# Patient Record
Sex: FEMALE | Race: WHITE | NOT HISPANIC OR LATINO | Employment: STUDENT | URBAN - METROPOLITAN AREA
[De-identification: names, ages, dates, MRNs, and addresses within clinical notes are randomized per-mention and may not be internally consistent; named-entity substitution may affect disease eponyms.]

---

## 2023-06-21 ENCOUNTER — OFFICE VISIT (OUTPATIENT)
Dept: PRIMARY CARE | Facility: CLINIC | Age: 23
End: 2023-06-21
Payer: OTHER GOVERNMENT

## 2023-06-21 VITALS
TEMPERATURE: 98.3 F | SYSTOLIC BLOOD PRESSURE: 104 MMHG | WEIGHT: 116 LBS | HEIGHT: 65 IN | BODY MASS INDEX: 19.33 KG/M2 | OXYGEN SATURATION: 98 % | DIASTOLIC BLOOD PRESSURE: 72 MMHG

## 2023-06-21 DIAGNOSIS — Z48.02 ENCOUNTER FOR REMOVAL OF SUTURES: Primary | ICD-10-CM

## 2023-06-21 PROCEDURE — 1036F TOBACCO NON-USER: CPT | Performed by: NURSE PRACTITIONER

## 2023-06-21 PROCEDURE — 99202 OFFICE O/P NEW SF 15 MIN: CPT | Performed by: NURSE PRACTITIONER

## 2023-06-21 ASSESSMENT — ENCOUNTER SYMPTOMS
CONSTIPATION: 0
FEVER: 0
CHILLS: 0
DIARRHEA: 0
FATIGUE: 0
WOUND: 1
NAUSEA: 0
HEADACHES: 1
APPETITE CHANGE: 0
SLEEP DISTURBANCE: 0
ACTIVITY CHANGE: 0
VOMITING: 0
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
## 2023-06-21 NOTE — ASSESSMENT & PLAN NOTE
5 sutures removed for L eyelid  Pt tolerated well  Educated on s/s of infection  Follow up with PCP as planned  ER for any s/s of infection

## 2023-06-27 ENCOUNTER — OFFICE VISIT (OUTPATIENT)
Dept: PRIMARY CARE | Facility: CLINIC | Age: 23
End: 2023-06-27
Payer: OTHER GOVERNMENT

## 2023-06-27 VITALS
DIASTOLIC BLOOD PRESSURE: 68 MMHG | SYSTOLIC BLOOD PRESSURE: 100 MMHG | BODY MASS INDEX: 18.99 KG/M2 | TEMPERATURE: 97.5 F | RESPIRATION RATE: 18 BRPM | HEIGHT: 65 IN | HEART RATE: 82 BPM | WEIGHT: 114 LBS | OXYGEN SATURATION: 97 %

## 2023-06-27 DIAGNOSIS — H11.32 SUBCONJUNCTIVAL HEMORRHAGE OF LEFT EYE: ICD-10-CM

## 2023-06-27 DIAGNOSIS — Z83.42 FAMILY HISTORY OF FAMILIAL HYPERCHOLESTEROLEMIA: ICD-10-CM

## 2023-06-27 DIAGNOSIS — F32.A DEPRESSION, UNSPECIFIED DEPRESSION TYPE: ICD-10-CM

## 2023-06-27 DIAGNOSIS — Z12.4 SCREENING FOR CERVICAL CANCER: ICD-10-CM

## 2023-06-27 DIAGNOSIS — Z00.00 WELLNESS EXAMINATION: Primary | ICD-10-CM

## 2023-06-27 DIAGNOSIS — R20.2 NUMBNESS AND TINGLING IN LEFT ARM: ICD-10-CM

## 2023-06-27 DIAGNOSIS — F41.8 ANXIETY ABOUT HEALTH: ICD-10-CM

## 2023-06-27 DIAGNOSIS — R20.0 NUMBNESS AND TINGLING IN LEFT ARM: ICD-10-CM

## 2023-06-27 DIAGNOSIS — S01.81XD LACERATION OF SKIN OF FACE, SUBSEQUENT ENCOUNTER: ICD-10-CM

## 2023-06-27 PROCEDURE — 99214 OFFICE O/P EST MOD 30 MIN: CPT | Performed by: NURSE PRACTITIONER

## 2023-06-27 ASSESSMENT — PATIENT HEALTH QUESTIONNAIRE - PHQ9
2. FEELING DOWN, DEPRESSED OR HOPELESS: NEARLY EVERY DAY
1. LITTLE INTEREST OR PLEASURE IN DOING THINGS: NEARLY EVERY DAY
SUM OF ALL RESPONSES TO PHQ9 QUESTIONS 1 AND 2: 6

## 2023-06-27 ASSESSMENT — ENCOUNTER SYMPTOMS
SHORTNESS OF BREATH: 0
DIZZINESS: 0
NERVOUS/ANXIOUS: 1
HEADACHES: 0
DEPRESSION: 0
OCCASIONAL FEELINGS OF UNSTEADINESS: 0
PALPITATIONS: 0
NECK PAIN: 0
NUMBNESS: 1
COUGH: 0
FATIGUE: 0
LOSS OF SENSATION IN FEET: 0
FEVER: 0
DYSPHORIC MOOD: 1
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
## 2023-10-09 ENCOUNTER — HOSPITAL ENCOUNTER (EMERGENCY)
Facility: HOSPITAL | Age: 23
Discharge: HOME | End: 2023-10-09
Attending: EMERGENCY MEDICINE
Payer: OTHER GOVERNMENT

## 2023-10-09 VITALS
DIASTOLIC BLOOD PRESSURE: 70 MMHG | HEART RATE: 64 BPM | TEMPERATURE: 97.9 F | HEIGHT: 65 IN | RESPIRATION RATE: 16 BRPM | BODY MASS INDEX: 17.83 KG/M2 | SYSTOLIC BLOOD PRESSURE: 132 MMHG | WEIGHT: 107 LBS | OXYGEN SATURATION: 95 %

## 2023-10-09 DIAGNOSIS — J18.9 ATYPICAL PNEUMONIA: ICD-10-CM

## 2023-10-09 DIAGNOSIS — M94.0 COSTOCHONDRITIS, ACUTE: Primary | ICD-10-CM

## 2023-10-09 LAB
ALBUMIN SERPL BCP-MCNC: 4.1 G/DL (ref 3.4–5)
ALP SERPL-CCNC: 85 U/L (ref 33–110)
ALT SERPL W P-5'-P-CCNC: 16 U/L (ref 7–45)
ANION GAP SERPL CALC-SCNC: 12 MMOL/L (ref 10–20)
AST SERPL W P-5'-P-CCNC: 22 U/L (ref 9–39)
B-HCG SERPL-ACNC: <2 MIU/ML
BASOPHILS # BLD AUTO: 0.05 X10*3/UL (ref 0–0.1)
BASOPHILS NFR BLD AUTO: 0.3 %
BILIRUB SERPL-MCNC: 0.8 MG/DL (ref 0–1.2)
BUN SERPL-MCNC: 10 MG/DL (ref 6–23)
CALCIUM SERPL-MCNC: 8.8 MG/DL (ref 8.6–10.3)
CARDIAC TROPONIN I PNL SERPL HS: 3 NG/L (ref 0–13)
CHLORIDE SERPL-SCNC: 107 MMOL/L (ref 98–107)
CO2 SERPL-SCNC: 22 MMOL/L (ref 21–32)
CREAT SERPL-MCNC: 0.79 MG/DL (ref 0.5–1.05)
EOSINOPHIL # BLD AUTO: 0.13 X10*3/UL (ref 0–0.7)
EOSINOPHIL NFR BLD AUTO: 0.8 %
ERYTHROCYTE [DISTWIDTH] IN BLOOD BY AUTOMATED COUNT: 13.2 % (ref 11.5–14.5)
GFR SERPL CREATININE-BSD FRML MDRD: >90 ML/MIN/1.73M*2
GLUCOSE SERPL-MCNC: 104 MG/DL (ref 74–99)
HCT VFR BLD AUTO: 40.5 % (ref 36–46)
HGB BLD-MCNC: 13.2 G/DL (ref 12–16)
IMM GRANULOCYTES # BLD AUTO: 0.05 X10*3/UL (ref 0–0.7)
IMM GRANULOCYTES NFR BLD AUTO: 0.3 % (ref 0–0.9)
LYMPHOCYTES # BLD AUTO: 2.03 X10*3/UL (ref 1.2–4.8)
LYMPHOCYTES NFR BLD AUTO: 12.5 %
MCH RBC QN AUTO: 30.3 PG (ref 26–34)
MCHC RBC AUTO-ENTMCNC: 32.6 G/DL (ref 32–36)
MCV RBC AUTO: 93 FL (ref 80–100)
MONOCYTES # BLD AUTO: 1.08 X10*3/UL (ref 0.1–1)
MONOCYTES NFR BLD AUTO: 6.7 %
NEUTROPHILS # BLD AUTO: 12.87 X10*3/UL (ref 1.2–7.7)
NEUTROPHILS NFR BLD AUTO: 79.4 %
NRBC BLD-RTO: 0 /100 WBCS (ref 0–0)
PLATELET # BLD AUTO: 431 X10*3/UL (ref 150–450)
PMV BLD AUTO: 9 FL (ref 7.5–11.5)
POTASSIUM SERPL-SCNC: 3.9 MMOL/L (ref 3.5–5.3)
PROT SERPL-MCNC: 6.9 G/DL (ref 6.4–8.2)
RBC # BLD AUTO: 4.35 X10*6/UL (ref 4–5.2)
SODIUM SERPL-SCNC: 137 MMOL/L (ref 136–145)
WBC # BLD AUTO: 16.2 X10*3/UL (ref 4.4–11.3)

## 2023-10-09 PROCEDURE — 80053 COMPREHEN METABOLIC PANEL: CPT | Performed by: STUDENT IN AN ORGANIZED HEALTH CARE EDUCATION/TRAINING PROGRAM

## 2023-10-09 PROCEDURE — 99284 EMERGENCY DEPT VISIT MOD MDM: CPT | Performed by: EMERGENCY MEDICINE

## 2023-10-09 PROCEDURE — 85025 COMPLETE CBC W/AUTO DIFF WBC: CPT | Performed by: EMERGENCY MEDICINE

## 2023-10-09 PROCEDURE — 80053 COMPREHEN METABOLIC PANEL: CPT | Performed by: EMERGENCY MEDICINE

## 2023-10-09 PROCEDURE — 84484 ASSAY OF TROPONIN QUANT: CPT | Performed by: EMERGENCY MEDICINE

## 2023-10-09 PROCEDURE — 71045 X-RAY EXAM CHEST 1 VIEW: CPT | Performed by: RADIOLOGY

## 2023-10-09 PROCEDURE — 85025 COMPLETE CBC W/AUTO DIFF WBC: CPT | Performed by: STUDENT IN AN ORGANIZED HEALTH CARE EDUCATION/TRAINING PROGRAM

## 2023-10-09 PROCEDURE — 2500000004 HC RX 250 GENERAL PHARMACY W/ HCPCS (ALT 636 FOR OP/ED)

## 2023-10-09 PROCEDURE — 84702 CHORIONIC GONADOTROPIN TEST: CPT | Performed by: STUDENT IN AN ORGANIZED HEALTH CARE EDUCATION/TRAINING PROGRAM

## 2023-10-09 PROCEDURE — 99283 EMERGENCY DEPT VISIT LOW MDM: CPT | Mod: 25

## 2023-10-09 PROCEDURE — 36415 COLL VENOUS BLD VENIPUNCTURE: CPT | Performed by: STUDENT IN AN ORGANIZED HEALTH CARE EDUCATION/TRAINING PROGRAM

## 2023-10-09 PROCEDURE — 84484 ASSAY OF TROPONIN QUANT: CPT | Performed by: STUDENT IN AN ORGANIZED HEALTH CARE EDUCATION/TRAINING PROGRAM

## 2023-10-09 PROCEDURE — 84702 CHORIONIC GONADOTROPIN TEST: CPT | Performed by: EMERGENCY MEDICINE

## 2023-10-09 PROCEDURE — 96372 THER/PROPH/DIAG INJ SC/IM: CPT

## 2023-10-09 RX ORDER — HYDROCODONE BITARTRATE AND ACETAMINOPHEN 5; 325 MG/1; MG/1
1 TABLET ORAL EVERY 8 HOURS PRN
Qty: 9 TABLET | Refills: 0 | Status: SHIPPED | OUTPATIENT
Start: 2023-10-09 | End: 2023-10-12

## 2023-10-09 RX ORDER — IBUPROFEN 600 MG/1
600 TABLET ORAL EVERY 6 HOURS PRN
Qty: 28 TABLET | Refills: 0 | Status: SHIPPED | OUTPATIENT
Start: 2023-10-09 | End: 2023-10-16

## 2023-10-09 RX ORDER — KETOROLAC TROMETHAMINE 30 MG/ML
15 INJECTION, SOLUTION INTRAMUSCULAR; INTRAVENOUS ONCE
Status: COMPLETED | OUTPATIENT
Start: 2023-10-09 | End: 2023-10-09

## 2023-10-09 RX ORDER — DOXYCYCLINE 100 MG/1
100 CAPSULE ORAL 2 TIMES DAILY
Qty: 20 CAPSULE | Refills: 0 | Status: SHIPPED | OUTPATIENT
Start: 2023-10-09 | End: 2023-10-19

## 2023-10-09 RX ORDER — MORPHINE SULFATE 2 MG/ML
2 INJECTION, SOLUTION INTRAMUSCULAR; INTRAVENOUS ONCE
Status: DISCONTINUED | OUTPATIENT
Start: 2023-10-09 | End: 2023-10-09

## 2023-10-09 RX ADMIN — KETOROLAC TROMETHAMINE 15 MG: 60 INJECTION, SOLUTION INTRAMUSCULAR at 17:51

## 2023-10-09 ASSESSMENT — COLUMBIA-SUICIDE SEVERITY RATING SCALE - C-SSRS
2. HAVE YOU ACTUALLY HAD ANY THOUGHTS OF KILLING YOURSELF?: NO
1. IN THE PAST MONTH, HAVE YOU WISHED YOU WERE DEAD OR WISHED YOU COULD GO TO SLEEP AND NOT WAKE UP?: NO
6. HAVE YOU EVER DONE ANYTHING, STARTED TO DO ANYTHING, OR PREPARED TO DO ANYTHING TO END YOUR LIFE?: NO

## 2023-10-09 ASSESSMENT — LIFESTYLE VARIABLES
EVER FELT BAD OR GUILTY ABOUT YOUR DRINKING: NO
EVER HAD A DRINK FIRST THING IN THE MORNING TO STEADY YOUR NERVES TO GET RID OF A HANGOVER: NO
HAVE PEOPLE ANNOYED YOU BY CRITICIZING YOUR DRINKING: NO
HAVE YOU EVER FELT YOU SHOULD CUT DOWN ON YOUR DRINKING: NO

## 2023-10-09 ASSESSMENT — PAIN SCALES - GENERAL: PAINLEVEL_OUTOF10: 10 - WORST POSSIBLE PAIN

## 2023-10-09 ASSESSMENT — PAIN - FUNCTIONAL ASSESSMENT: PAIN_FUNCTIONAL_ASSESSMENT: 0-10

## 2023-10-09 ASSESSMENT — PAIN DESCRIPTION - DESCRIPTORS: DESCRIPTORS: ACHING

## 2023-10-09 ASSESSMENT — PAIN DESCRIPTION - ONSET: ONSET: SUDDEN

## 2023-10-09 ASSESSMENT — PAIN DESCRIPTION - ORIENTATION: ORIENTATION: RIGHT

## 2023-10-09 ASSESSMENT — PAIN DESCRIPTION - LOCATION: LOCATION: RIB CAGE

## 2023-10-09 ASSESSMENT — PAIN DESCRIPTION - FREQUENCY: FREQUENCY: CONSTANT/CONTINUOUS

## 2023-10-09 NOTE — DISCHARGE INSTRUCTIONS
Please call your PCP office in 48 hours to establish follow-up.    You are prescribed medication, please use as directed.    Return to the emergency department if you develop fever, increasing shortness of breath, increases in heart rate, uncontrollable pain, or any other new symptoms you find concerning.

## 2023-10-10 NOTE — ED PROVIDER NOTES
"HPI   Chief Complaint   Patient presents with    Flank Pain     Right rib pain below breast started yesterday, denies injury       Patient is a 23-year-old female with no known medical history presenting with right-sided rib pain.  Pain began last night, located on right rib, constant, sharp, aggravated by breathing deeply and movement, no alleviating factors, no radiation, 10/10 severity.  Associated with fever, chills, nausea, palpitations.  She denies recent travel, recent surgery, periods of prolonged immobilization.    Denies  vomiting, diarrhea, shortness of breath, cough, abdominal pain, urinary changes, hematuria, dysuria, changes in stool, hematochezia, melena.  PMH: As above  PSH: Reviewed  Allergies: NKDA  SH:   Tobacco: Reports vaping, when questioned further on how much response with \" I do not know\"  Alcohol: patient unable to give objective measure, reports \"moderate\".  Drug use: Reports cocaine use 1 month ago.  FMH: Reviewed and noncontributory                          Akbar Coma Scale Score: 15                  Patient History   No past medical history on file.  Past Surgical History:   Procedure Laterality Date    APPENDECTOMY      HERNIA REPAIR      WISDOM TOOTH EXTRACTION       Family History   Problem Relation Name Age of Onset    Hypertension Mother      Colon polyps Mother      Sleep apnea Father      Other (dm 2) Maternal Grandmother      Colon cancer Maternal Grandfather      Sleep apnea Paternal Grandmother       Social History     Tobacco Use    Smoking status: Every Day     Packs/day: 1     Types: Cigarettes     Passive exposure: Current    Smokeless tobacco: Never   Vaping Use    Vaping Use: Every day   Substance Use Topics    Alcohol use: Yes    Drug use: Yes     Types: MDMA (ecstacy), Cocaine       Physical Exam   ED Triage Vitals [10/09/23 1348]   Temp Heart Rate Resp BP   36.6 °C (97.9 °F) 78 18 140/67      SpO2 Temp Source Heart Rate Source Patient Position   100 % Temporal " Monitor --      BP Location FiO2 (%)     Left arm --       Physical Exam  Vitals reviewed.   Constitutional:       General: She is in acute distress.      Appearance: Normal appearance. She is not ill-appearing, toxic-appearing or diaphoretic.   HENT:      Head: Normocephalic and atraumatic.      Right Ear: External ear normal.      Left Ear: External ear normal.      Nose: Nose normal.      Mouth/Throat:      Mouth: Mucous membranes are moist.      Pharynx: Oropharynx is clear.   Eyes:      Extraocular Movements: Extraocular movements intact.      Pupils: Pupils are equal, round, and reactive to light.   Cardiovascular:      Rate and Rhythm: Normal rate and regular rhythm.      Pulses: Normal pulses.      Heart sounds: Normal heart sounds. No murmur heard.     No friction rub. No gallop.   Pulmonary:      Effort: No respiratory distress.      Breath sounds: Normal breath sounds. No wheezing, rhonchi or rales.   Abdominal:      General: Abdomen is flat. Bowel sounds are normal. There is no distension.      Palpations: Abdomen is soft. There is no mass.      Tenderness: There is no abdominal tenderness.      Hernia: No hernia is present.   Musculoskeletal:         General: Tenderness present. Normal range of motion.      Cervical back: Normal range of motion and neck supple.      Comments: Right rib tenderness to palpation on the lateral chest wall.   Skin:     General: Skin is warm and dry.      Capillary Refill: Capillary refill takes less than 2 seconds.   Neurological:      General: No focal deficit present.      Mental Status: She is alert and oriented to person, place, and time. Mental status is at baseline.   Psychiatric:         Mood and Affect: Mood normal.         Behavior: Behavior normal.         ED Course & MDM        Medical Decision Making  23-year-old female presenting with right rib pain.  Work-up significant for Negative hCG, negative troponin, CMP unremarkable, CBC with 16.2 WBC and 12.87  neutrophils, chest x-ray shows no active disease in the chest.  Patient was given Toradol for pain control with improvement in pain.  Patient is PERC negative.  Patient presentation likely due to chest wall pain syndrome.  Patient history concerning for atypical pneumonia as she reports she had a cold 4 days prior to presentation.  Patient prescribed doxycycline, Norco for breakthrough pain, and ibuprofen.  Instructed to follow-up with PCP.  Return precautions given.  Patient verbalized understanding.  Patient medically stable for discharge.        Procedure  Procedures     Arash Briggs DO  Resident  10/10/23 0038

## 2023-10-11 ENCOUNTER — HOSPITAL ENCOUNTER (OUTPATIENT)
Dept: CARDIOLOGY | Facility: HOSPITAL | Age: 23
Discharge: HOME | End: 2023-10-11
Payer: OTHER GOVERNMENT

## 2023-10-11 LAB
ATRIAL RATE: 89 BPM
P AXIS: 22 DEGREES
P OFFSET: 190 MS
P ONSET: 152 MS
PR INTERVAL: 136 MS
Q ONSET: 220 MS
QRS COUNT: 15 BEATS
QRS DURATION: 86 MS
QT INTERVAL: 382 MS
QTC CALCULATION(BAZETT): 464 MS
QTC FREDERICIA: 435 MS
R AXIS: 59 DEGREES
T AXIS: 46 DEGREES
T OFFSET: 411 MS
VENTRICULAR RATE: 89 BPM

## 2023-10-11 PROCEDURE — 93005 ELECTROCARDIOGRAM TRACING: CPT

## 2023-10-13 ENCOUNTER — HOSPITAL ENCOUNTER (OUTPATIENT)
Dept: CARDIOLOGY | Facility: HOSPITAL | Age: 23
Discharge: HOME | End: 2023-10-13
Payer: OTHER GOVERNMENT

## 2023-10-13 PROCEDURE — 93005 ELECTROCARDIOGRAM TRACING: CPT

## 2024-04-18 ENCOUNTER — OFFICE VISIT (OUTPATIENT)
Dept: BEHAVIORAL HEALTH | Facility: CLINIC | Age: 24
End: 2024-04-18
Payer: OTHER GOVERNMENT

## 2024-04-18 VITALS — SYSTOLIC BLOOD PRESSURE: 124 MMHG | DIASTOLIC BLOOD PRESSURE: 74 MMHG | HEART RATE: 72 BPM

## 2024-04-18 DIAGNOSIS — F14.20 COCAINE USE DISORDER, SEVERE, DEPENDENCE (MULTI): ICD-10-CM

## 2024-04-18 DIAGNOSIS — F10.20 ALCOHOL USE DISORDER, SEVERE (MULTI): Primary | ICD-10-CM

## 2024-04-18 LAB
AMPHETAMINES UR QL SCN: ABNORMAL
BARBITURATES UR QL SCN: ABNORMAL
BZE UR QL SCN: ABNORMAL
CANNABINOIDS UR QL SCN: ABNORMAL
CREAT UR-MCNC: 120.4 MG/DL (ref 20–320)
PCP UR QL SCN: ABNORMAL

## 2024-04-18 PROCEDURE — 80353 DRUG SCREENING COCAINE: CPT | Performed by: NURSE PRACTITIONER

## 2024-04-18 PROCEDURE — 90791 PSYCH DIAGNOSTIC EVALUATION: CPT | Performed by: COUNSELOR

## 2024-04-18 PROCEDURE — 80307 DRUG TEST PRSMV CHEM ANLYZR: CPT | Performed by: NURSE PRACTITIONER

## 2024-04-18 PROCEDURE — 80365 DRUG SCREENING OXYCODONE: CPT | Performed by: NURSE PRACTITIONER

## 2024-04-18 PROCEDURE — 82570 ASSAY OF URINE CREATININE: CPT | Mod: 59 | Performed by: NURSE PRACTITIONER

## 2024-04-18 PROCEDURE — 80346 BENZODIAZEPINES1-12: CPT | Performed by: NURSE PRACTITIONER

## 2024-04-18 ASSESSMENT — PAIN SCALES - GENERAL: PAINLEVEL: 0-NO PAIN
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
## 2024-04-20 LAB — ETHYL GLUCURONIDE UR QL SCN: NEGATIVE NG/ML

## 2024-04-22 ENCOUNTER — MULTIDISCIPLINARY VISIT (OUTPATIENT)
Dept: BEHAVIORAL HEALTH | Facility: CLINIC | Age: 24
End: 2024-04-22
Payer: OTHER GOVERNMENT

## 2024-04-22 ENCOUNTER — APPOINTMENT (OUTPATIENT)
Dept: BEHAVIORAL HEALTH | Facility: CLINIC | Age: 24
End: 2024-04-22
Payer: OTHER GOVERNMENT

## 2024-04-22 DIAGNOSIS — F10.20 ALCOHOL USE DISORDER, SEVERE (MULTI): ICD-10-CM

## 2024-04-22 DIAGNOSIS — F14.20: ICD-10-CM

## 2024-04-22 PROCEDURE — 90853 GROUP PSYCHOTHERAPY: CPT

## 2024-04-22 NOTE — GROUP NOTE
Group Topic: Chemical Dependency - Relapse   Group Date: 4/22/2024  Start Time: 11:00 AM  End Time: 12:00 PM  Facilitators: OCHOA Rich   Department: [facility]    Number of Participants: 16   Treatment Modality: Cognitive Behavioral Therapy, Psychoeducation, and Skills Training  Interventions utilized were clarification, exploration, patient education, and support  Purpose: coping skills, insight or knowledge, relapse prevention strategies, and trigger / craving management  Comment: Group Counseling  Group began with a meditative reading about contributing and pts. were encouraged to reflect and respond. Members were encouraged to provide the group with an update regarding their recovery progress, as well as discuss shared experiences and observations.    First day of treatment. Introduced self to the group sharing briefly about recent treatment experiences. Will attend 5 day IOP this week.   Name: Nelson Osborn YOB: 2000   MR: [id number]      Level of Participation: when cued  Quality of Participation: appropriate/pleasant, drowsy, and quiet  Mood/Affect: appropriate  Progress: Minimal  Plan: continue with services

## 2024-04-22 NOTE — GROUP NOTE
Group Topic: Dialectical Behavioral Therapy - Mindfulness   Group Date: 4/22/2024  Start Time:  9:00 AM  End Time: 10:00 AM  Facilitators: OCHOA Rich   [facility]    Number of Participants: 6   Treatment Modality: Dialectical Behavioral Therapy  Interventions utilized were group exercise, education   Purpose: coping skills and relapse prevention strategies  Comment: Mindfulness and check in    Group began with a meditative reading about spirituality    and pts. were encouraged to reflect and respond.  This was followed by a brief explanation of the potential benefits of mindfulness.  An experiential exercise was completed and afterwards members were asked to share a non-judgmental observation about their experience as well as a daily gratitude. Members shared an update regarding recovery progress.   First day of this treatment.   Name: Nelson Osborn YOB: 2000   MR: [id number]      Level of Participation: minimal  Quality of Participation: drowsy and quiet  Mood/Affect: appropriate  Progress: Moderate  Plan: continue with services

## 2024-04-22 NOTE — GROUP NOTE
Group Topic: Chemical Dependency - Primary Disease   Group Date: 4/22/2024  Start Time: 10:00 AM  End Time: 11:00 AM  Facilitators: BEN Sharp   Department: [facility]    Number of Participants: 6   Treatment Modality: Psychoeducation  Interventions utilized were exploration and patient education  Purpose: coping skills, feelings, irrational fears, self-care, and trigger / craving management  Comment: 60 minute education group with patients from the [facility] level of care.  Patient listened attentively to a lecture on how to strengthen their distress tolerance skills.  The lecture was based upon the acronym ACCEPTS.  Patient also actively participated in a lengthy discussion on the topic of distress tolerance which followed the lecture.  Patient was engaged in the session.        Name: Nelson Osborn YOB: 2000   MR: [id number]      Level of Participation: when cued  Quality of Participation: drowsy  Mood/Affect: appropriate, but drowsy  Progress: Gaining insight or knowledge  Plan: continue with services

## 2024-04-23 ENCOUNTER — MULTIDISCIPLINARY VISIT (OUTPATIENT)
Dept: BEHAVIORAL HEALTH | Facility: CLINIC | Age: 24
End: 2024-04-23
Payer: OTHER GOVERNMENT

## 2024-04-23 ENCOUNTER — TELEMEDICINE (OUTPATIENT)
Dept: BEHAVIORAL HEALTH | Facility: CLINIC | Age: 24
End: 2024-04-23
Payer: OTHER GOVERNMENT

## 2024-04-23 DIAGNOSIS — F43.10 PTSD (POST-TRAUMATIC STRESS DISORDER): ICD-10-CM

## 2024-04-23 DIAGNOSIS — F31.9 BIPOLAR 1 DISORDER (MULTI): Primary | ICD-10-CM

## 2024-04-23 DIAGNOSIS — F10.20 ALCOHOL USE DISORDER, SEVERE (MULTI): Primary | ICD-10-CM

## 2024-04-23 DIAGNOSIS — F14.20: ICD-10-CM

## 2024-04-23 DIAGNOSIS — F41.1 GAD (GENERALIZED ANXIETY DISORDER): ICD-10-CM

## 2024-04-23 LAB
1OH-MIDAZOLAM UR CFM-MCNC: <25 NG/ML
6MAM UR CFM-MCNC: <25 NG/ML
7AMINOCLONAZEPAM UR CFM-MCNC: <25 NG/ML
A-OH ALPRAZ UR CFM-MCNC: <25 NG/ML
ALPRAZ UR CFM-MCNC: <25 NG/ML
BZE UR-MCNC: 236 NG/ML
CHLORDIAZEP UR CFM-MCNC: <25 NG/ML
CLONAZEPAM UR CFM-MCNC: <25 NG/ML
CODEINE UR CFM-MCNC: <50 NG/ML
DIAZEPAM UR CFM-MCNC: <25 NG/ML
EDDP UR CFM-MCNC: <25 NG/ML
FENTANYL UR CFM-MCNC: <2.5 NG/ML
HYDROCODONE CTO UR CFM-MCNC: <25 NG/ML
HYDROMORPHONE UR CFM-MCNC: <25 NG/ML
LORAZEPAM UR CFM-MCNC: <25 NG/ML
METHADONE UR CFM-MCNC: <25 NG/ML
MIDAZOLAM UR CFM-MCNC: <25 NG/ML
MORPHINE UR CFM-MCNC: <50 NG/ML
NORDIAZEPAM UR CFM-MCNC: <25 NG/ML
NORFENTANYL UR CFM-MCNC: <2.5 NG/ML
NORHYDROCODONE UR CFM-MCNC: <25 NG/ML
NOROXYCODONE UR CFM-MCNC: <25 NG/ML
NORTRAMADOL UR-MCNC: <50 NG/ML
OXAZEPAM UR CFM-MCNC: <25 NG/ML
OXYCODONE UR CFM-MCNC: <25 NG/ML
OXYMORPHONE UR CFM-MCNC: <25 NG/ML
TEMAZEPAM UR CFM-MCNC: <25 NG/ML
TRAMADOL UR CFM-MCNC: <50 NG/ML
ZOLPIDEM UR CFM-MCNC: <25 NG/ML
ZOLPIDEM UR-MCNC: <25 NG/ML

## 2024-04-23 PROCEDURE — 90792 PSYCH DIAG EVAL W/MED SRVCS: CPT

## 2024-04-23 PROCEDURE — 90853 GROUP PSYCHOTHERAPY: CPT

## 2024-04-23 RX ORDER — NALTREXONE HYDROCHLORIDE 50 MG/1
50 TABLET, FILM COATED ORAL NIGHTLY
COMMUNITY

## 2024-04-23 RX ORDER — ACETAMINOPHEN, DIPHENHYDRAMINE HCL, PHENYLEPHRINE HCL 325; 25; 5 MG/1; MG/1; MG/1
10 TABLET ORAL DAILY
COMMUNITY
Start: 2024-01-22 | End: 2024-04-23 | Stop reason: SDUPTHER

## 2024-04-23 RX ORDER — HYDROXYZINE PAMOATE 25 MG/1
25 CAPSULE ORAL 3 TIMES DAILY PRN
COMMUNITY
End: 2024-04-23 | Stop reason: SDUPTHER

## 2024-04-23 RX ORDER — ARIPIPRAZOLE 15 MG/1
15 TABLET ORAL DAILY
Qty: 30 TABLET | Refills: 0 | Status: SHIPPED | OUTPATIENT
Start: 2024-04-23 | End: 2024-05-03 | Stop reason: SDUPTHER

## 2024-04-23 RX ORDER — BUSPIRONE HYDROCHLORIDE 5 MG/1
5 TABLET ORAL 2 TIMES DAILY
COMMUNITY
End: 2024-04-23 | Stop reason: SDUPTHER

## 2024-04-23 RX ORDER — SERTRALINE HYDROCHLORIDE 50 MG/1
50 TABLET, FILM COATED ORAL DAILY
Qty: 30 TABLET | Refills: 0 | Status: SHIPPED | OUTPATIENT
Start: 2024-04-23 | End: 2024-05-03 | Stop reason: SDUPTHER

## 2024-04-23 RX ORDER — HYDROXYZINE PAMOATE 25 MG/1
25 CAPSULE ORAL 3 TIMES DAILY PRN
Qty: 30 CAPSULE | Refills: 0 | Status: SHIPPED | OUTPATIENT
Start: 2024-04-23 | End: 2024-05-03 | Stop reason: SDUPTHER

## 2024-04-23 RX ORDER — ACETAMINOPHEN, DIPHENHYDRAMINE HCL, PHENYLEPHRINE HCL 325; 25; 5 MG/1; MG/1; MG/1
10 TABLET ORAL DAILY
Qty: 30 TABLET | Refills: 0 | Status: SHIPPED | OUTPATIENT
Start: 2024-04-23 | End: 2024-05-03 | Stop reason: SDUPTHER

## 2024-04-23 RX ORDER — ARIPIPRAZOLE 15 MG/1
15 TABLET ORAL DAILY
COMMUNITY
End: 2024-04-23 | Stop reason: SDUPTHER

## 2024-04-23 RX ORDER — BUSPIRONE HYDROCHLORIDE 5 MG/1
5 TABLET ORAL 2 TIMES DAILY
Qty: 60 TABLET | Refills: 0 | Status: SHIPPED | OUTPATIENT
Start: 2024-04-23 | End: 2024-05-03 | Stop reason: SDUPTHER

## 2024-04-23 RX ORDER — PRAZOSIN HYDROCHLORIDE 1 MG/1
1 CAPSULE ORAL NIGHTLY
Qty: 30 CAPSULE | Refills: 0 | Status: SHIPPED | OUTPATIENT
Start: 2024-04-23 | End: 2024-05-03 | Stop reason: SDUPTHER

## 2024-04-23 RX ORDER — PRAZOSIN HYDROCHLORIDE 1 MG/1
1 CAPSULE ORAL NIGHTLY
COMMUNITY
End: 2024-04-23 | Stop reason: SDUPTHER

## 2024-04-23 RX ORDER — SERTRALINE HYDROCHLORIDE 50 MG/1
50 TABLET, FILM COATED ORAL DAILY
COMMUNITY
End: 2024-04-23 | Stop reason: SDUPTHER

## 2024-04-23 ASSESSMENT — ANXIETY QUESTIONNAIRES
4. TROUBLE RELAXING: SEVERAL DAYS
GAD7 TOTAL SCORE: 8
3. WORRYING TOO MUCH ABOUT DIFFERENT THINGS: SEVERAL DAYS
5. BEING SO RESTLESS THAT IT IS HARD TO SIT STILL: SEVERAL DAYS
6. BECOMING EASILY ANNOYED OR IRRITABLE: MORE THAN HALF THE DAYS
1. FEELING NERVOUS, ANXIOUS, OR ON EDGE: SEVERAL DAYS
2. NOT BEING ABLE TO STOP OR CONTROL WORRYING: SEVERAL DAYS
IF YOU CHECKED OFF ANY PROBLEMS ON THIS QUESTIONNAIRE, HOW DIFFICULT HAVE THESE PROBLEMS MADE IT FOR YOU TO DO YOUR WORK, TAKE CARE OF THINGS AT HOME, OR GET ALONG WITH OTHER PEOPLE: SOMEWHAT DIFFICULT
7. FEELING AFRAID AS IF SOMETHING AWFUL MIGHT HAPPEN: SEVERAL DAYS

## 2024-04-23 ASSESSMENT — PATIENT HEALTH QUESTIONNAIRE - PHQ9
SUM OF ALL RESPONSES TO PHQ9 QUESTIONS 1 & 2: 0
2. FEELING DOWN, DEPRESSED OR HOPELESS: NOT AT ALL
1. LITTLE INTEREST OR PLEASURE IN DOING THINGS: NOT AT ALL

## 2024-04-23 ASSESSMENT — ENCOUNTER SYMPTOMS
DECREASED CONCENTRATION: 1
CONSTITUTIONAL NEGATIVE: 1

## 2024-04-23 NOTE — GROUP NOTE
Group Topic: Goals   Group Date: 4/23/2024  Start Time: 11:00 AM  End Time: 12:00 PM  Facilitators: OCHOA Rich   [facility]    Number of Participants: 1   Treatment Modality: Psychoeducation  Interventions utilized were patient education, problem solving, and support  Purpose: coping skills, self-care, and relapse prevention strategies  Comment: Orientation to IOP  Program orientation/Establishing Goals of treatment  Given and reviewed the patient packet which included information about program structure, expectations, meeting verification/requirement, urine drug screens, family program, etc. Patients participated in establishing treatment goals and were given a patient input questionnaire to gain further understanding of their goals and expectations for treatment.  Reviewed goal areas including -maintaining abstinence by developing a lifestyle that supports ongoing abstinence. Agreement with goals. Discussed 12 step meeting attendance.         Reviewed patient packet and pt agreeable to treatment goals. Discussed several things she is to complete today and tomorrow morning as a way to begin to build structure and routine into her life.   Name: Nelson Osborn YOB: 2000   MR: [id number]      Level of Participation: active  Quality of Participation: attentive and engaged  Mood/Affect: anxious and restless  Progress: Moderate  Plan: continue with services

## 2024-04-23 NOTE — PROGRESS NOTES
"Virtual or Telephone Consent    An interactive audio and video telecommunication system which permits real time communications between the patient (at the originating site) and provider (at the distant site) was utilized to provide this telehealth service.   Verbal consent was requested and obtained from Nelson Osborn on this date, 04/23/24 for a telehealth visit.     Patient is reminded that if there is SI to call [phone] and get themselves to the closest ED for evaluation, otherwise contact me for other questions/concerns.     HPI  Nelson Osborn is a 23 y.o. female patient with a chief complaint of   Chief Complaint   Patient presents with    Bipolar    Anxiety    PTSD (Post-Traumatic Stress Disorder)      Patient presenting to outpatient treatment for a scheduled psych outpatient psychiatric evaluation.  Background :    Patient explains that symptoms of bipolar began when patient was very young. She reports she was placed on medications that have been helpful for her symptoms but she ran out.     Characteristics/Recent psychiatric symptoms (pertinent positives and negatives):    Patient explains that during the time she was experiencing bipolar symptoms, her mood was \" up and down.  I would get very depressed, then manic.\"   The duration of symptoms occurred all her life but reports they were gone when she was on drugs such Cocaine, and Ecstasy. Reports currently  anxiety is high due to not being on her prescribed medications. Reports she ran out of medications. Patient explains that she doesn't have anxiety attacks, but \" only constant anxiety.\"  Patient ranks the severity of anxiety using the DOMO 7 scale with a rating of 8 for anxiety symptoms. Patient ranks the severity of depression using the PHQ 9 scale with a rating of 0 for depressive symptoms. Patient does explain that current dose of Buspar  has been successful in the management of anxiety symptoms and Zoloft has been helping for both anxiety and depression. " "    NOTE: Symptom scale is rated where 0 = no symptoms at all, and 10 = symptoms so severe that pt is an imminent danger to themselves or others and requires hospitalization.    Anxiety, Mood dysregulation, Focus issues, Cravings, and Sleep disturbances remain(s) present more days than not, which has worsened  over the past few weeks. Nelson Osborn rates the severity of psych symptoms as a 9/10, noting symptom improvement with talking with friends and hanging out with people and worsening of symptoms with Criticism, yelling and being out of her medications.      Aggravating and/or relieving factors/triggers :  Aggravating factors are words, mainly criticism triggers her bipolar symptoms   Relieving factors is taking streets drugs were helping her with her symptoms in the past.       Bipolar Screening:  -Reports difficulty concentrating, risk-taking behavior ' Overspending\", impulsivity, and racing thoughts \" when I go to bed.\"  -Ever feel like you have a lot of energy you don't know what to with : Reports has this occurring every day for last 2 weeks  -Have a lot of tasks going and cant keep up : denies  - Anytime where you have so much energy you cant sleep : Reports it has been happening the past 2 weeks and takes vistaril or melatonin which helps her sleep  -Ever feel really irritable or angry : a couple of days  -Throwing things or punching things : denies  -Ever have a time in which you thought you were the most awesome person in the world or where you thought everything was right in the world : \" Yes, that happened 2 days ago. \" I was just in a really good mood.\" Reports she \"felt like a little god.\"  -Feeling more impulsive : Every day  -Anyone ever tell you that you were talking too much or too fast : \"Yes, often\"    Psychiatric Review Of Systems:  -Mood: \" I feel hopeful, optimistic\"  Depressive Symptoms: negative  Manic Symptoms: excessive spending, not needing much sleep, and periods of extra " "energy/hyperactivity  Anxiety Symptoms: General Anxiety Disorder (DOMO)DOMO Behaviors: difficult to control worry, excessive anxiety/worry, difficulty concentrating, irritability, and sleep disturbance  Psychotic Symptoms: negative  Other Symptoms:  -Sleep/Energy/Motivation: Reports sleep fluctuates anywhere from 4-10 hours a day  -Appetite/Weight Changes: \" been good \"  -Psychosis: Denies  -SI/HI: Denies      HISTORY  PSYCH HISTORY  -Psych Hx: \" Bipolar mixed form, DOMO, BETTY, Severe Alcohol use disorder\"  -Psych Hospitalization Hx: Most recent was September last year for self harm reports self cutting. Reports slicing her arm and leg with a big knife. First one was admitted for Mental break down diagnosed with Bipolar  -Suicide Attempt Hx:  one time took benadryl, second time took Advil, another time took Windex  -Self-Harm/Violence Hx: Self cutting,   -Current psych meds: Zoloft, Buspar, vistaril, Abilify, Prazosin  -Past Psych meds : Strattera, Seroquel, Olanzapine reports drugs didn't work    SUBSTANCE USE HISTORY  -Substance Use Hx: history of coacaine, ecstacy and marijuana use   -Alcohol: Not currently  -Tobacco: Vape daily  -Caffeine:  3 cups a day  -Substance Abuse Treatment Hx: At  currently in treatment for BETTY    FAMILY HISTORY  -Family Psych Hx: Unaware  -Family Suicide Hx: denies  -Family Substance Abuse Hx: yes on mother side    SOCIAL HISTORY  -Supports: parents and friends  -Housing: Home with family  -Income: Unemployed  -Education:  HS diploma. And a year in college  -Legal: 2 DUIs, 3 days in [facility] for the DUI  -Abuse Hx: denies     Current Medications:  No current outpatient medications on file.     Medical History:  No past medical history on file.  Surgical History:  Past Surgical History:   Procedure Laterality Date    APPENDECTOMY      HERNIA REPAIR      WISDOM TOOTH EXTRACTION       Family History:  Family History   Problem Relation Name Age of Onset    Hypertension Mother      Colon polyps " "Mother      Sleep apnea Father      Other (dm 2) Maternal Grandmother      Colon cancer Maternal Grandfather      Sleep apnea Paternal Grandmother       Social History:  Social History     Socioeconomic History    Marital status: Single     Spouse name: Not on file    Number of children: Not on file    Years of education: Not on file    Highest education level: Not on file   Occupational History    Not on file   Tobacco Use    Smoking status: Every Day     Current packs/day: 1.00     Types: Cigarettes     Passive exposure: Current    Smokeless tobacco: Never   Vaping Use    Vaping status: Every Day   Substance and Sexual Activity    Alcohol use: Yes    Drug use: Yes     Types: MDMA (ecstacy), Cocaine    Sexual activity: Not on file   Other Topics Concern    Not on file   Social History Narrative    Not on file     Social Determinants of Health     Financial Resource Strain: Not on file   Food Insecurity: Not on file   Transportation Needs: Not on file   Physical Activity: Not on file   Stress: Not on file   Social Connections: Not on file   Intimate Partner Violence: Not on file   Housing Stability: Not on file         -PCP: Romina Cloud MD  -Pt reports currently is not pregnant, and currently is sexually active, does not use birth control. LMP:  2 weeks ago  -TBI/head trauma/LOC/seizure hx: Denies    REVIEW OF SYSTEMS  Review of Systems   Constitutional: Negative.    Psychiatric/Behavioral:  Positive for decreased concentration.      Objective   Mental Status Exam    Appearance: Well groomed    Attitude: Cooperative, and engaged in conversation.    Behavior: Appropriate eye contact.     Motor Activity: No psychomotor agitation or retardation. No abnormal movements, tremors or tics. No evidence of extrapyramidal symptoms or tardive dyskinesia.    Speech: Regular rate, rhythm, volume. Spontaneous, no pressured speech.    Mood: \" I feel hopeful, optimistic \"    Affect: Euthymic, full range, mood " congruent.    Thought Process: Linear, logical, and goal-directed. No loose associations or gross thought disorganization.    Thought Content: Denied current suicidal ideation or thoughts of harm to self, denied homicidal ideation or thoughts of harm to others. No delusional thinking elicited. No perseverations or obsessions identified.     Perception: Did not endorse auditory or visual hallucinations, did not appear to be responding to hallucinatory stimuli.     Cognition: Alert, oriented x3. Preserved attention span and concentration, recent and remote memory. Adequate fund of knowledge. No deficits in language.     Insight: Fair, in regards to understanding mental health condition    Judgement: Fair        Physical Exam      MEDICAL-DECISION MAKING    Patient educated and verbalized understanding on benefits, risks, and side effects of current medications. Outside referrals for psychotherapy sent to patient via [facility]. Follow-up with psychiatry in 4 weeks for medication evaluation and effectiveness.        Psych med regimen as follows:   -New orders for existing medications that ran out:  Abilify 15 mg, Patient has been taking 7.5 mg in the morning and 7.5 mg at night. Reports medication has been effective. Patient encouraged to keep BID method  Buspar 5 mg BID  Melatonin 10 mg Nightly  Zoloft 50 mg daily   Prazosin 1 mg at bedtime  Hydroxyzine 25 mg TID PRN  Referral for therapy      IMPRESSION  Nelson was seen today for bipolar, anxiety and ptsd (post-traumatic stress disorder).  Diagnoses and all orders for this visit:  Bipolar 1 disorder (Multi) (Primary)  -     ARIPiprazole (Abilify) 15 mg tablet; Take 1 tablet (15 mg) by mouth once daily.  -     Referral to Psychology; Future  DOMO (generalized anxiety disorder)  -     busPIRone (Buspar) 5 mg tablet; Take 1 tablet (5 mg) by mouth 2 times a day.  -     hydrOXYzine pamoate (Vistaril) 25 mg capsule; Take 1 capsule (25 mg) by mouth 3 times a day as needed for  itching.  -     melatonin 10 mg tablet; Take 1 tablet (10 mg) by mouth once daily.  -     sertraline (Zoloft) 50 mg tablet; Take 1 tablet (50 mg) by mouth once daily.  -     Referral to Psychology; Future  PTSD (post-traumatic stress disorder)  -     prazosin (Minipress) 1 mg capsule; Take 1 capsule (1 mg) by mouth once daily at bedtime.  -     Referral to Psychology; Future        SI/HI ASSESSMENT  -Risk Assessment: The pt is currently a low acute risk of suicide and self-harm despite past suicide attempt(s) and not currently endorsing thoughts of suicide. The pt is currently a low acute risk of violence and harm to others due to no past history of violence and not currently threatening others.  -Suicidal Risk Factors:   -Violence Risk Factors: unemployment  -Protective Factors: positive family relationships  -Plan to Reduce Risk: Establish medication regimen and outpatient follow-up care  Denied current suicidal ideation or thoughts of harm to self, denied homicidal ideation or thoughts of harm to others. No delusional thinking elicited. No perseverations or obsessions identified.       PLAN  -Continue Medications as directed.  -Follow-up with this provider in 4 weeks.  -Risks/benefits/assessment of medication interventions discussed with pt; pt agreeable to plan. Will continue to monitor for symptoms mgmt and SEs and adjust plan as needed.  -MI to increase coping skills/behavior regulation.  -Safety plan reviewed.  -Call  Psychiatry at [phone] with issues.  -For [facility] residents, [employer] is a 24/7 hotline you can call for assistance at [phone] [phone]. Please call 911 or go to your closest Emergency Room if you feel worse. This includes thoughts of hurting yourself or anyone else, or having other troubles such as hearing voices, seeing visions, or having new and scary thoughts about the people around you.    Reviewed OARRS on [04/23/2024] by [CHAPINCITO Curtis-CNP] -OAS has  been reviewed and is consistent with prescribed medications, Considered the risks of abuse, dependence, addiction and diversion, Medication is felt to be clinically appropriate based on documented diagnosis.    Recent Results (from the past 8760 hour(s))   Screen Opiate/Opioid/Benzo Prescription Compliance    Collection Time: 04/18/24  3:33 PM   Result Value Ref Range    Creatinine, Urine Random 120.4 20.0 - 320.0 mg/dL    Amphetamine Screen, Urine Presumptive Negative Presumptive Negative    Barbiturate Screen, Urine Presumptive Negative Presumptive Negative    Cannabinoid Screen, Urine Presumptive Negative Presumptive Negative    Cocaine Metabolite Screen, Urine Presumptive Positive (A) Presumptive Negative    PCP Screen, Urine Presumptive Negative Presumptive Negative     Fabby Nayak, APRN-CNP  Record Review: extensive    Follow up:   No follow-ups on file.    Time Spent:  Prep: 5 minutes  Direct time: 60 minutes  Documentation: 20 minutes  Total: 85 minutes

## 2024-04-23 NOTE — GROUP NOTE
Group Topic: Dialectical Behavioral Therapy - Mindfulness   Group Date: 4/23/2024  Start Time:  9:00 AM  End Time: 10:00 AM  Facilitators: OCHOA Rich   Department: [facility]    Number of Participants: 1   Treatment Modality: Dialectical Behavioral Therapy  Interventions utilized were group exercise, patient education, and support  Purpose: coping skills and relapse prevention strategies  Comment: Mindfulness and check in    Group began with a meditative reading and pts. were encouraged to reflect and respond.  This was followed by a brief explanation of the potential benefits of mindfulness.  An experiential exercise was completed and afterwards members were asked to share a non-judgmental observation about their experience as well as a daily gratitude. Members shared an update regarding recovery progress.   Reports poor sleep-going to bed around midnight but waking up frequently throughout the night. Napping at times during the day.   Name: Nelson Osborn YOB: 2000   MR: [id number]      Level of Participation: active  Quality of Participation: appropriate/pleasant, attentive, and engaged  Mood/Affect: appropriate  Progress: Minimal  Plan: referral / recommendations-Pt to see psych eval  provider today at 1:30 pm virtual

## 2024-04-23 NOTE — GROUP NOTE
Group Topic: Individual Programming   Group Date: 4/23/2024  Start Time: 10:00 AM  End Time: 11:00 AM  Facilitators: OCHOA Rich   [facility]    Number of Participants: 1   Treatment Modality: Psychoeducation  Interventions utilized were clarification, exploration, patient education, and support  Purpose: coping skills, insight or knowledge, relapse prevention strategies, and trigger / craving management  Comment: Counseling  Reviewed history f treatment and current support system. Pt. Is to see psych eval later today. Discussed and established several concrete practices that will support her with treatment compliance. Discussed possibility of sober living house. Recommended she change her phone umber to further support her decision to end several using relationships. She is ambivalent. She has little structure to her day. Unemployed. No car or license.   Name: Nelson Osborn YOB: 2000   MR: [id number]      Level of Participation: active  Quality of Participation: appropriate/pleasant and engaged  Mood/Affect: anxious and restless  Progress: Moderate  Plan: continue with services

## 2024-04-24 ENCOUNTER — MULTIDISCIPLINARY VISIT (OUTPATIENT)
Dept: BEHAVIORAL HEALTH | Facility: CLINIC | Age: 24
End: 2024-04-24
Payer: OTHER GOVERNMENT

## 2024-04-24 DIAGNOSIS — F10.20 ALCOHOL USE DISORDER, SEVERE (MULTI): ICD-10-CM

## 2024-04-24 PROCEDURE — 90853 GROUP PSYCHOTHERAPY: CPT

## 2024-04-24 NOTE — GROUP NOTE
Group Topic: Coping Skills   Group Date: 4/24/2024  Start Time:  9:00 AM  End Time: 10:00 AM  Facilitators: Martha Shahid, PhD   Department: [facility]    Number of Participants: 5   Treatment Modality: Leisure Development and Skills Training  Interventions utilized were group exercise and support  Purpose: insight or knowledge and self-care  Comment: Meditation session begin with a reading from 24/7 discussing Do you believe AA works, the discussion led to speaking about what Higher Power looks like. The next session completed a 10 minute calm guided meditation. The session ended with a discussion of emotions and how it can or cannot control the outcome of of decisions whether the decisions made are positive or negative.     Name: Nelson Osborn YOB: 2000   MR: [id number]      Level of Participation: withdrawn  Quality of Participation: drowsy and quiet  Mood/Affect: closed / guarded  Progress: Gaining insight or knowledge  Plan: continue with services

## 2024-04-24 NOTE — GROUP NOTE
Group Topic: Reflection   Group Date: 4/24/2024  Start Time: 11:00 AM  End Time: 12:00 PM  Facilitators: Martha Shahid, PhD   Department: [facility]    Number of Participants: 6   Treatment Modality: Leisure Development and Skills Training  Interventions utilized were problem solving and support  Purpose: communication skills, insight or knowledge, and relapse prevention strategies  Comment: Group consist of reading from [employer] that focused on perception and turing negative thoughts into positive thoughts. A discussion was led with individuals sharing their own personal stories. Group led to speaking about any family orientated concerns and problem-solving with ending the group with the Serenity Prayer.     Name: Nelson Osborn YOB: 2000   MR: [id number]      Level of Participation: active  Quality of Participation: attentive, cooperative, and engaged  Mood/Affect: appropriate and positive  Progress: Gaining insight or knowledge; very opened in group discussing how she has a personality of wanting things to change instantly. She understands this is a place of self work.   Plan: continue with services

## 2024-04-24 NOTE — GROUP NOTE
Group Topic: Chemical Dependency - Primary Disease   Group Date: 4/24/2024  Start Time: 10:00 AM  End Time: 11:00 AM  Facilitators: BEN Sharp   Department: [facility]    Number of Participants: 6   Treatment Modality: Psychoeducation  Interventions utilized were exploration and patient education  Purpose: feelings, insight or knowledge, self-care, and trigger / craving management  Comment: 60 minute education session with members of the [facility] level of treatment.  Discussed how grief often accompanies recovery because we are losing our relationship/coping mechanism of alcohol and/or drugs.  Talked about the five stages of grief, and how these might look in a recovering person's life.  Patient shared about their loss/grief related to addiction and/or recovery and identified what grief stage they thought they were presently in.  Patient was active and engaged in the education session.       Name: Nelson Osborn YOB: 2000   MR: [id number]      Level of Participation: when cued  Quality of Participation: appropriate/pleasant, attentive, cooperative, and engaged  Mood/Affect: appropriate  Progress: Gaining insight or knowledge  Plan: continue with services

## 2024-04-25 ENCOUNTER — MULTIDISCIPLINARY VISIT (OUTPATIENT)
Dept: BEHAVIORAL HEALTH | Facility: CLINIC | Age: 24
End: 2024-04-25
Payer: OTHER GOVERNMENT

## 2024-04-25 DIAGNOSIS — F10.20 ALCOHOL USE DISORDER, SEVERE (MULTI): ICD-10-CM

## 2024-04-25 DIAGNOSIS — F41.1 GAD (GENERALIZED ANXIETY DISORDER): ICD-10-CM

## 2024-04-25 DIAGNOSIS — F14.20: ICD-10-CM

## 2024-04-25 DIAGNOSIS — F43.10 PTSD (POST-TRAUMATIC STRESS DISORDER): ICD-10-CM

## 2024-04-25 PROCEDURE — 90853 GROUP PSYCHOTHERAPY: CPT

## 2024-04-25 NOTE — GROUP NOTE
Group Topic: Chemical Dependency - Relapse   Group Date: 4/25/2024  Start Time: 11:00 AM  End Time: 12:00 PM  Facilitators: OCHOA Rich   [facility]    Number of Participants: 6   Treatment Modality: Psychoeducation  Interventions utilized were clarification, patient education, and support  Purpose: coping skills and relapse prevention strategies  Comment: Group Counseling    Group began with a meditative reading and pts. were encouraged to reflect and respond. Members were encouraged to provide the group with an update regarding their recovery progress, as well as discuss shared experiences and observations.    Nelson informed the group of high risk situation coming up this weekend. Peers provided feedback which she was receptive to, and although she repots she will still attend she was able to verbalize a plan to support ongoing abstinence.   Name: Nelson Osborn YOB: 2000   MR: [id number]      Level of Participation: active  Quality of Participation: engaged and immature  Mood/Affect: appropriate  Progress: Moderate  Plan: continue with services

## 2024-04-25 NOTE — GROUP NOTE
Group Topic: Feeling Awareness/Expression   Group Date: 4/25/2024  Start Time: 10:00 AM  End Time: 11:00 AM  Facilitators: Martha Shahid, PhD   [facility]    Number of Participants: 6   Treatment Modality: Psychoeducation  Interventions utilized were patient education  Purpose: communication skills  Comment: Education group focused on communication in addiction recovery to understand and communication, help foster healthier relationships and supporting sustained recovery journeys. Navigated developing essential skills for interpersonal interactions with confidence and clarity. Three primary goals are to increase self-awareness of communication patterns, empower group with practical communication techniques such as active listening and assertiveness, and foster the ability to build and maintain healthy relationships through effective communication strategies. The group ended with open dialogue of any unanswered questions about communication skills.     Name: Nelson Osborn YOB: 2000   MR: [id number]      Level of Participation: when cued  Quality of Participation: drowsy and quiet  Mood/Affect: bored  Progress: Gaining insight or knowledge  Plan: continue with services

## 2024-04-25 NOTE — GROUP NOTE
Group Topic: Dialectical Behavioral Therapy - Mindfulness   Group Date: 4/25/2024  Start Time:  9:00 AM  End Time: 10:00 AM  Facilitators: OCHOA Rich   [facility]    Number of Participants: 5   Treatment Modality: Dialectical Behavioral Therapy  Interventions utilized were group exercise, patient education, and support  Purpose: coping skills, insight or knowledge, and relapse prevention strategies  Comment: Mindfulness and check in    Group began with a meditative reading about gratitude   and pts. were encouraged to reflect and respond.  This was followed by a brief explanation of the potential benefits of mindfulness.  An experiential exercise was completed and afterwards members were asked to share a non-judgmental observation about their experience as well as a daily gratitude. Members shared an update regarding recovery progress.   Drowsy. Started new medications last night.   Name: Nelson Osborn YOB: 2000   MR: [id number]      Level of Participation: when cued  Quality of Participation: appropriate/pleasant  Mood/Affect:  drowsy  Progress: Moderate  Plan: continue with services

## 2024-04-26 ENCOUNTER — MULTIDISCIPLINARY VISIT (OUTPATIENT)
Dept: BEHAVIORAL HEALTH | Facility: CLINIC | Age: 24
End: 2024-04-26
Payer: OTHER GOVERNMENT

## 2024-04-26 DIAGNOSIS — F10.20 ALCOHOL USE DISORDER, SEVERE (MULTI): ICD-10-CM

## 2024-04-26 PROCEDURE — 90853 GROUP PSYCHOTHERAPY: CPT

## 2024-04-26 NOTE — GROUP NOTE
Group Topic: Dialectical Behavioral Therapy - Mindfulness   Group Date: 4/26/2024  Start Time:  9:00 AM  End Time: 10:00 AM  Facilitators: OCHOA Rich   Department: [facility]  Group began with a meditative reading about 'blessings'  and pts. were encouraged to reflect and respond.  This was followed by a brief explanation of the potential benefits of mindfulness.  An experiential exercise was completed and afterwards members were asked to share a non-judgmental observation about their experience as well as a daily gratitude. Members shared an update regarding recovery progress. Nelson participated in learning 'four square breathing' and an insight orientated meditation. She shared that she had established some limits in regards to weekend plans and attended a 12 step meting last night with boyfriend.       Name: Nelson Osborn YOB: 2000   MR: [id number]      Level of Participation: active  Quality of Participation: appropriate/pleasant and drowsy  Mood/Affect: appropriate  Progress: Gaining insight or knowledge  Plan: continue with services

## 2024-04-26 NOTE — GROUP NOTE
Group Topic: Chemical Dependency - Relapse   Group Date: 4/26/2024  Start Time: 11:00 AM  End Time: 12:00 PM  Facilitators: BEN Sharp   Department: [facility]    Number of Participants: 1   Treatment Modality: Cognitive Behavioral Therapy and Dialectical Behavioral Therapy  Interventions utilized were exploration, reality testing, and support  Purpose: communication skills, self-care, and trigger / craving management  Comment: Group therapy with patient from the [facility] level of treatment.  Started the group with a reading from “Body, Mind, and Spirit.”  The reading talked about the importance of relationships in recovery.  Patient shared what they could relate to from today's reading.  Patient then provided a recovery update including any recent and/or current sobriety threats.     Name: Nelson Osborn YOB: 2000   MR: [id number]      Level of Participation: active  Quality of Participation: appropriate/pleasant, attentive, cooperative, and engaged  Mood/Affect: appropriate  Progress: Gaining insight or knowledge.  Patient was active and engaged in today's group therapy session.  She shared about her own spirituality.  Patient also talked about how she was suspicious of friends during her active addiction.  Patient reports current sobriety from mind/mood altering substances.    Plan: continue with services

## 2024-04-26 NOTE — GROUP NOTE
Group Topic: Chemical Dependency - Primary Disease   Group Date: 4/26/2024  Start Time: 10:00 AM  End Time: 11:00 AM  Facilitators: BEN Sharp   Department: [facility]    Number of Participants: 1   Treatment Modality: Psychoeducation  Interventions utilized were patient education  Purpose: coping skills, insight or knowledge, self-care, and relapse prevention strategies  Comment: Patient attended education session on “God As We Understand Him”, a DVD that tells about the origins of AA and the variety of beliefs among AA members.  Discussion included Druze versus spirituality, the 12 steps, and powerlessness. Patient was attentive to the video and participated in the discussion.          Name: Nelson Osborn YOB: 2000   MR: [id number]      Level of Participation: when cued  Quality of Participation: appropriate/pleasant, attentive, cooperative, and engaged  Mood/Affect: appropriate  Progress: Gaining insight or knowledge  Plan: continue with services

## 2024-04-29 ENCOUNTER — MULTIDISCIPLINARY VISIT (OUTPATIENT)
Dept: BEHAVIORAL HEALTH | Facility: CLINIC | Age: 24
End: 2024-04-29
Payer: OTHER GOVERNMENT

## 2024-04-29 DIAGNOSIS — F10.20 ALCOHOL USE DISORDER, SEVERE (MULTI): ICD-10-CM

## 2024-04-29 DIAGNOSIS — F14.20: ICD-10-CM

## 2024-04-29 DIAGNOSIS — F43.10 PTSD (POST-TRAUMATIC STRESS DISORDER): ICD-10-CM

## 2024-04-29 LAB
AMPHETAMINES UR QL SCN: ABNORMAL
BARBITURATES UR QL SCN: ABNORMAL
BZE UR QL SCN: ABNORMAL
CANNABINOIDS UR QL SCN: ABNORMAL
CREAT UR-MCNC: 43.1 MG/DL (ref 20–320)
PCP UR QL SCN: ABNORMAL

## 2024-04-29 PROCEDURE — 80353 DRUG SCREENING COCAINE: CPT

## 2024-04-29 PROCEDURE — 80321 ALCOHOLS BIOMARKERS 1OR 2: CPT

## 2024-04-29 PROCEDURE — 90853 GROUP PSYCHOTHERAPY: CPT

## 2024-04-29 PROCEDURE — 80346 BENZODIAZEPINES1-12: CPT

## 2024-04-29 PROCEDURE — 80307 DRUG TEST PRSMV CHEM ANLYZR: CPT

## 2024-04-29 NOTE — GROUP NOTE
Group Topic: Chemical Dependency - Relapse   Group Date: 4/29/2024  Start Time: 11:00 AM  End Time: 12:00 PM  Facilitators: BEN Sharp; OCHOA Rich   [facility]    Number of Participants: 13   Treatment Modality: Cognitive Behavioral Therapy and Dialectical Behavioral Therapy  Interventions utilized were exploration, reality testing, and support  Purpose: feelings, communication skills, self-care, and trigger / craving management  Comment: Group therapy with members of the [facility] and aftercare levels of treatment.  Started the group with a reading from “Body, Mind, and Spirit.”  The reading talked about being kind to ourselves in recovery.  Group members shared what they could relate to from today's reading.  Group members then provided recovery updates including any recent and/or current sobriety threats.        Name: Nelson Osborn YOB: 2000   MR: [id number]      Level of Participation: active  Quality of Participation: appropriate/pleasant, attentive, cooperative, and engaged  Mood/Affect: appropriate  Progress: Gaining insight or knowledge.  Patient was active and engaged in today's group session.  She reported to the group that she had relapsed on Friday night (alcohol and cocaine).  She has not used again since that time.  She did tell her parents.  The group offered her both support and feedback.    Plan: continue with services

## 2024-04-29 NOTE — GROUP NOTE
Group Topic: Personal Responsibility   Group Date: 4/29/2024  Start Time: 10:00 AM  End Time: 11:00 AM  Facilitators: BEN Mckenna   Department:  [facility]    Number of Participants: 5   Group Focus: chemical dependency education  Treatment Modality: Psychoeducation  Interventions utilized were patient education  Purpose: relapse prevention strategies    Name: Nelson Osborn YOB: 2000   MR: [id number]      Facilitator: Licensed Professional Counselor  Level of Participation: minimal  Quality of Participation: appropriate/pleasant  Interactions with others: appropriate  Mood/Affect: bored  Triggers (if applicable): NA  Cognition: coherent/clear  Progress: Minimal  Comments: Pt. Present but quiet   Plan: continue with services

## 2024-04-29 NOTE — GROUP NOTE
Group Topic: Dialectical Behavioral Therapy - Mindfulness   Group Date: 4/29/2024  Start Time:  9:00 AM  End Time: 10:00 AM  Facilitators: OCHOA Rich   [facility]    Number of Participants: 4   Treatment Modality: Dialectical Behavioral Therapy  Interventions utilized were group exercise and patient education  Purpose: coping skills and insight or knowledge  Comment: Mindfulness and check in    Group began with a meditative reading and pts. were encouraged to reflect and respond.  This was followed by a brief explanation of the potential benefits of mindfulness.  An experiential exercise on 'self love' was completed and afterwards members were asked to share a non-judgmental observation about their experience as well as a daily gratitude. Members shared an update regarding recovery progress.   Self reflecting on reading. Admits to stuffing emotions and understands the relationship between emotional management and addiction recovery.   Name: Nelson Osborn YOB: 2000   MR: [id number]      Level of Participation: active  Quality of Participation: appropriate/pleasant and engaged  Mood/Affect: appropriate  Progress: Moderate  Plan: continue with services

## 2024-04-29 NOTE — GROUP NOTE
Group Topic: Dialectical Behavioral Therapy - Mindfulness   Group Date: 4/29/2024  Start Time:  9:00 AM  End Time: 10:00 AM  Facilitators: OCHOA Rich   [facility]    Number of Participants: 4   Treatment Modality: Dialectical Behavioral Therapy  Interventions utilized were group exercise and patient education  Purpose: coping skills and insight or knowledge  Comment: Mindfulness and check in    Group began with a meditative reading and pts. were encouraged to reflect and respond.  This was followed by a brief explanation of the potential benefits of mindfulness.  An experiential exercise on 'self love' was completed and afterwards members were asked to share a non-judgmental observation about their experience as well as a daily gratitude. Members shared an update regarding recovery progress.   Expressed gratitude for parents and self reflected on the reading sharing she is more emotionally reactive when in active addiction.   Name: Nelson Osborn YOB: 2000   MR: [id number]      Level of Participation: moderate  Quality of Participation: appropriate/pleasant and drowsy  Mood/Affect: appropriate  Progress: Moderate  Plan: continue with services

## 2024-04-30 ENCOUNTER — APPOINTMENT (OUTPATIENT)
Dept: BEHAVIORAL HEALTH | Facility: CLINIC | Age: 24
End: 2024-04-30
Payer: OTHER GOVERNMENT

## 2024-05-01 ENCOUNTER — MULTIDISCIPLINARY VISIT (OUTPATIENT)
Dept: BEHAVIORAL HEALTH | Facility: CLINIC | Age: 24
End: 2024-05-01
Payer: OTHER GOVERNMENT

## 2024-05-01 DIAGNOSIS — F14.20: ICD-10-CM

## 2024-05-01 DIAGNOSIS — F43.10 PTSD (POST-TRAUMATIC STRESS DISORDER): ICD-10-CM

## 2024-05-01 DIAGNOSIS — F10.20 ALCOHOL USE DISORDER, SEVERE (MULTI): ICD-10-CM

## 2024-05-01 LAB — ETHYL GLUCURONIDE UR QL SCN: NORMAL NG/ML

## 2024-05-01 PROCEDURE — 90853 GROUP PSYCHOTHERAPY: CPT

## 2024-05-01 NOTE — GROUP NOTE
Group Topic: Chemical Dependency - Relapse   Group Date: 5/1/2024  Start Time: 11:00 AM  End Time: 12:00 PM  Facilitators: BEN Sharp; OCHOA Rich   Department: [facility]    Number of Participants: 6   Treatment Modality: Cognitive Behavioral Therapy and Dialectical Behavioral Therapy  Interventions utilized were exploration, reality testing, and support  Purpose: feelings, communication skills, and trigger / craving management  Comment: Group therapy session with members of the [facility] level of treatment.  Today was family day, and we had several family members in attendance.  Started the group with a reading from “Body, Mind, and Spirit.”  The reading talked about how most people experience anxiety at the beginning of their recovery journey.  Group members shared what they could relate to from today's reading.  The group then discussed family related recovery issues.    Name: Nelson Osborn YOB: 2000   MR: [id number]      Level of Participation: active  Quality of Participation: appropriate/pleasant, attentive, cooperative, and engaged  Mood/Affect: appropriate  Progress: Gaining insight or knowledge.  Patient was active and engaged in today's group session.  Her mother and father were both present in today's group session for family day.  Patient shared that it felt good to not feel alone in recovery.  The group supported her in this statement.  Patient's mom talked about how she has tried to deal with her daughter's addiction and how hard it has been for her.  The group offered her both feedback and support.    Plan: continue with services

## 2024-05-01 NOTE — GROUP NOTE
Group Topic: Chemical Dependency - Primary Disease   Group Date: 5/1/2024  Start Time: 10:00 AM  End Time: 11:00 AM  Facilitators: OCHOA Rich   [facility]    Number of Participants: 6/5 support persons  Treatment Modality: Psychoeducation  Interventions utilized were clarification, patient education, and support  Purpose: coping skills and insight or knowledge  Comment: Education/Family Disease    Family Addiction   Goal of this session was to increase awareness of the parallel symptoms between both family members and the person with the addiction- defining what constitutes 'family disease/recovery'.  Briefly reviewed recommended actions for families that will support their loved one during treatment. Today we reviewed signs/symptoms of the progression of addiction and recovery, for both family members and person with addiction. Discussion followed and members related.   Present with both mother and father. Parents attentive and mom at times tearful. Nelson nathan.   Name: Nelson Osborn YOB: 2000   MR: [id number]      Level of Participation: minimal  Quality of Participation: drowsy  Mood/Affect: brightens with interaction  Progress: Minimal  Plan: follow-up needed-Continue with 5 day IOP. Treatment planning.

## 2024-05-01 NOTE — GROUP NOTE
Group Topic: Dialectical Behavioral Therapy - Mindfulness   Group Date: 5/1/2024  Start Time:  9:00 AM  End Time: 10:00 AM  Facilitators: BEN Sharp   Department: [facility]    Number of Participants: 6   Treatment Modality: Dialectical Behavioral Therapy  Interventions utilized were exploration, group exercise, and support  Purpose: maladaptive thinking, feelings, communication skills, insight or knowledge, self-care, and trigger / craving management  Comment: Patient participated in a mindfulness meditation exercise and a group check-in.  The purpose and benefits of mindfulness meditation in strengthening relapse prevention skills was explained.  A mindfulness meditation exercise was conducted, and patient provided feedback in the form of personal observations from the exercise.    Name: Nelson Osborn YOB: 2000   MR: [id number]      Level of Participation: when cued  Quality of Participation: appropriate/pleasant, attentive, cooperative, and engaged  Mood/Affect: appropriate  Progress: Gaining insight or knowledge  Plan: continue with services

## 2024-05-02 ENCOUNTER — MULTIDISCIPLINARY VISIT (OUTPATIENT)
Dept: BEHAVIORAL HEALTH | Facility: CLINIC | Age: 24
End: 2024-05-02
Payer: OTHER GOVERNMENT

## 2024-05-02 DIAGNOSIS — F10.20 ALCOHOL USE DISORDER, SEVERE (MULTI): Primary | ICD-10-CM

## 2024-05-02 DIAGNOSIS — F43.10 PTSD (POST-TRAUMATIC STRESS DISORDER): ICD-10-CM

## 2024-05-02 DIAGNOSIS — F31.9 BIPOLAR 1 DISORDER (MULTI): ICD-10-CM

## 2024-05-02 LAB — HCG UR QL IA.RAPID: NEGATIVE

## 2024-05-02 PROCEDURE — 90832 PSYTX W PT 30 MINUTES: CPT | Performed by: STUDENT IN AN ORGANIZED HEALTH CARE EDUCATION/TRAINING PROGRAM

## 2024-05-02 PROCEDURE — 90853 GROUP PSYCHOTHERAPY: CPT

## 2024-05-02 PROCEDURE — 81025 URINE PREGNANCY TEST: CPT

## 2024-05-02 NOTE — GROUP NOTE
Group Topic: Problem Solving   Group Date: 5/2/2024  Start Time: 10:00 AM  End Time: 11:00 AM  Facilitators: Martha Shahid, PhD   Department: [facility]    Number of Participants: 7   Treatment Modality: Psychoeducation  Interventions utilized were patient education and problem solving  Purpose: maladaptive thinking and insight or knowledge  Comment: Education Group focused on learning, understanding and recognizing Cognitive Distortions in themselves and how it relates to their BETTY use.     Name: Nelson Osborn YOB: 2000   MR: [id number]      Level of Participation: withdrawn  Quality of Participation: drowsy and quiet  Mood/Affect: restless  Progress: None  Plan: patient will be encouraged to stay awake during group and participate with engagement.

## 2024-05-02 NOTE — PROGRESS NOTES
30 min individual treatment planning 8:30 am  Discussed and identified tasks that would support abstinence.   Discussed ambivalence, reviewed mental health symptoms etc.  Discussed behaviors tat will increase the likelihood of medication compliance though she enjoys how she currently feels which she identifies as 'manic'. She reports sleep is disordered but she is noncompliant with meds and is routinely drowsy during groups. Today she figidits.   She agreed to call and schedule GYN, recommended to attend AA alone in order to increase likelihood she develops sober female supports, identify at least 1 AA meetings that she could attend each day. Call sober [facility] to inquire. She is ambivalent about this as well. She was reluctant to spend 20 minutes after group to identify the daily AA meetings because her boyfriend would be waiting.     BOUCHRA PACKER, [facility]-CS

## 2024-05-02 NOTE — GROUP NOTE
Group Topic: Dialectical Behavioral Therapy - Mindfulness   Group Date: 5/2/2024  Start Time:  9:00 AM  End Time: 10:00 AM  Facilitators: OCHOA Rich   [facility]    Number of Participants: 7   Treatment Modality: Dialectical Behavioral Therapy  Interventions utilized were group exercise, patient education, and support  Purpose: coping skills and maladaptive thinking  Comment: Mindfulness and check in    Group began with a meditative reading and pts. were encouraged to reflect and respond.  This was followed by a brief explanation of the potential benefits of mindfulness.  An experiential exercise was completed and afterwards members were asked to share a non-judgmental observation about their experience as well as a daily gratitude. Members shared an update regarding recovery progress.   Drowsy, alternating with inappropriate behaviors eating during meditation despite redirection to abstain, announcing health conditions upon entering room, etc.)  Name: Nelson Osborn YOB: 2000   MR: [id number]      Level of Participation: when cued  Quality of Participation: distracting to others and drowsy  Mood/Affect:  hypomanic, laughing   Progress: Minimal  Plan: follow-up needed; continue with services

## 2024-05-02 NOTE — GROUP NOTE
Group Topic: Chemical Dependency - Relapse   Group Date: 5/2/2024  Start Time: 11:00 AM  End Time: 12:00 PM  Facilitators: OCHOA Rich   Department: [facility]    Group began with a meditative reading and pts. were encouraged to reflect and respond. Members were encouraged to provide the group with an update regarding their recovery progress, as well as discuss shared experiences and observations.  Nelson laughing, eating and wondering aloud if she is manic. Sharing about ambivalence. Made committment to change her phone number this weekend.     Name: Nelson Osborn YOB: 2000   MR: [id number]      Level of Participation: active  Quality of Participation: appropriate/pleasant, distracting to others, and hyperactive, immature at times  Mood/Affect: manic and restless  Progress: gaining insight and knowledge  Plan: continue with services; parental involvement, recommended sober housing, schedule GYN visit, behaviors to prompt mediation compliance.

## 2024-05-03 ENCOUNTER — HOSPITAL ENCOUNTER (EMERGENCY)
Facility: HOSPITAL | Age: 24
Discharge: HOME | End: 2024-05-04
Attending: EMERGENCY MEDICINE
Payer: OTHER GOVERNMENT

## 2024-05-03 ENCOUNTER — MULTIDISCIPLINARY VISIT (OUTPATIENT)
Dept: BEHAVIORAL HEALTH | Facility: CLINIC | Age: 24
End: 2024-05-03
Payer: OTHER GOVERNMENT

## 2024-05-03 ENCOUNTER — OFFICE VISIT (OUTPATIENT)
Dept: BEHAVIORAL HEALTH | Facility: CLINIC | Age: 24
End: 2024-05-03
Payer: OTHER GOVERNMENT

## 2024-05-03 VITALS
WEIGHT: 120.3 LBS | SYSTOLIC BLOOD PRESSURE: 105 MMHG | RESPIRATION RATE: 16 BRPM | HEIGHT: 65 IN | HEART RATE: 77 BPM | TEMPERATURE: 98.3 F | BODY MASS INDEX: 20.04 KG/M2 | DIASTOLIC BLOOD PRESSURE: 64 MMHG

## 2024-05-03 DIAGNOSIS — F31.9 BIPOLAR 1 DISORDER (MULTI): Primary | ICD-10-CM

## 2024-05-03 DIAGNOSIS — R45.1 AGITATION: Primary | ICD-10-CM

## 2024-05-03 DIAGNOSIS — F41.1 GAD (GENERALIZED ANXIETY DISORDER): ICD-10-CM

## 2024-05-03 DIAGNOSIS — F43.10 PTSD (POST-TRAUMATIC STRESS DISORDER): ICD-10-CM

## 2024-05-03 DIAGNOSIS — F10.20 ALCOHOL USE DISORDER, SEVERE (MULTI): Primary | ICD-10-CM

## 2024-05-03 PROCEDURE — 90837 PSYTX W PT 60 MINUTES: CPT | Performed by: STUDENT IN AN ORGANIZED HEALTH CARE EDUCATION/TRAINING PROGRAM

## 2024-05-03 PROCEDURE — 99284 EMERGENCY DEPT VISIT MOD MDM: CPT

## 2024-05-03 PROCEDURE — 80307 DRUG TEST PRSMV CHEM ANLYZR: CPT

## 2024-05-03 PROCEDURE — 90853 GROUP PSYCHOTHERAPY: CPT

## 2024-05-03 PROCEDURE — 99285 EMERGENCY DEPT VISIT HI MDM: CPT | Performed by: EMERGENCY MEDICINE

## 2024-05-03 PROCEDURE — 99215 OFFICE O/P EST HI 40 MIN: CPT

## 2024-05-03 RX ORDER — BUSPIRONE HYDROCHLORIDE 5 MG/1
5 TABLET ORAL 2 TIMES DAILY
Qty: 60 TABLET | Refills: 0 | Status: SHIPPED | OUTPATIENT
Start: 2024-05-03 | End: 2024-05-23 | Stop reason: SDUPTHER

## 2024-05-03 RX ORDER — ACETAMINOPHEN, DIPHENHYDRAMINE HCL, PHENYLEPHRINE HCL 325; 25; 5 MG/1; MG/1; MG/1
10 TABLET ORAL DAILY
Qty: 30 TABLET | Refills: 0 | Status: SHIPPED | OUTPATIENT
Start: 2024-05-03 | End: 2024-05-23 | Stop reason: SDUPTHER

## 2024-05-03 RX ORDER — ARIPIPRAZOLE 15 MG/1
15 TABLET ORAL DAILY
Qty: 30 TABLET | Refills: 0 | Status: SHIPPED | OUTPATIENT
Start: 2024-05-03 | End: 2024-05-23 | Stop reason: SDUPTHER

## 2024-05-03 RX ORDER — SERTRALINE HYDROCHLORIDE 50 MG/1
50 TABLET, FILM COATED ORAL DAILY
Qty: 30 TABLET | Refills: 0 | Status: SHIPPED | OUTPATIENT
Start: 2024-05-03 | End: 2024-05-23 | Stop reason: SDUPTHER

## 2024-05-03 RX ORDER — PRAZOSIN HYDROCHLORIDE 1 MG/1
1 CAPSULE ORAL NIGHTLY
Qty: 30 CAPSULE | Refills: 0 | Status: SHIPPED | OUTPATIENT
Start: 2024-05-03 | End: 2024-05-23 | Stop reason: SDUPTHER

## 2024-05-03 RX ORDER — HYDROXYZINE PAMOATE 25 MG/1
25 CAPSULE ORAL 3 TIMES DAILY PRN
Qty: 90 CAPSULE | Refills: 0 | Status: SHIPPED | OUTPATIENT
Start: 2024-05-03 | End: 2024-05-23 | Stop reason: SDUPTHER

## 2024-05-03 ASSESSMENT — PAIN - FUNCTIONAL ASSESSMENT: PAIN_FUNCTIONAL_ASSESSMENT: 0-10

## 2024-05-03 ASSESSMENT — ENCOUNTER SYMPTOMS
CONSTITUTIONAL NEGATIVE: 1
PSYCHIATRIC NEGATIVE: 1

## 2024-05-03 ASSESSMENT — ANXIETY QUESTIONNAIRES
IF YOU CHECKED OFF ANY PROBLEMS ON THIS QUESTIONNAIRE, HOW DIFFICULT HAVE THESE PROBLEMS MADE IT FOR YOU TO DO YOUR WORK, TAKE CARE OF THINGS AT HOME, OR GET ALONG WITH OTHER PEOPLE: SOMEWHAT DIFFICULT
GAD7 TOTAL SCORE: 1
2. NOT BEING ABLE TO STOP OR CONTROL WORRYING: SEVERAL DAYS
5. BEING SO RESTLESS THAT IT IS HARD TO SIT STILL: NOT AT ALL
1. FEELING NERVOUS, ANXIOUS, OR ON EDGE: NOT AT ALL
6. BECOMING EASILY ANNOYED OR IRRITABLE: NOT AT ALL
3. WORRYING TOO MUCH ABOUT DIFFERENT THINGS: NOT AT ALL
4. TROUBLE RELAXING: NOT AT ALL
7. FEELING AFRAID AS IF SOMETHING AWFUL MIGHT HAPPEN: NOT AT ALL

## 2024-05-03 ASSESSMENT — COLUMBIA-SUICIDE SEVERITY RATING SCALE - C-SSRS
6. HAVE YOU EVER DONE ANYTHING, STARTED TO DO ANYTHING, OR PREPARED TO DO ANYTHING TO END YOUR LIFE?: NO
5. HAVE YOU STARTED TO WORK OUT OR WORKED OUT THE DETAILS OF HOW TO KILL YOURSELF? DO YOU INTEND TO CARRY OUT THIS PLAN?: YES
6. HAVE YOU EVER DONE ANYTHING, STARTED TO DO ANYTHING, OR PREPARED TO DO ANYTHING TO END YOUR LIFE?: YES
1. IN THE PAST MONTH, HAVE YOU WISHED YOU WERE DEAD OR WISHED YOU COULD GO TO SLEEP AND NOT WAKE UP?: YES
2. HAVE YOU ACTUALLY HAD ANY THOUGHTS OF KILLING YOURSELF?: YES
4. HAVE YOU HAD THESE THOUGHTS AND HAD SOME INTENTION OF ACTING ON THEM?: YES

## 2024-05-03 ASSESSMENT — PAIN SCALES - GENERAL: PAINLEVEL_OUTOF10: 0 - NO PAIN

## 2024-05-03 NOTE — PROGRESS NOTES
"Assessment/Plan     Impression:  Nelson Osborn is a 23 y.o. female who presents in person with her parents for  a follow up appointment with CC of  \" I need instruction on how to take  my medicines.\" Patient consents to treatment.    Problem List Items Addressed This Visit             ICD-10-CM    Bipolar 1 disorder (Multi) - Primary F31.9    Relevant Medications    ARIPiprazole (Abilify) 15 mg tablet    sertraline (Zoloft) 50 mg tablet    DOMO (generalized anxiety disorder) F41.1    Relevant Medications    busPIRone (Buspar) 5 mg tablet    hydrOXYzine pamoate (Vistaril) 25 mg capsule    melatonin 10 mg tablet    sertraline (Zoloft) 50 mg tablet    PTSD (post-traumatic stress disorder) F43.10    Relevant Medications    prazosin (Minipress) 1 mg capsule       Patient is reminded that if there is SI to call [phone] and get themselves to the closest ED for evaluation, otherwise contact me for other questions/concerns.      Nelson Osborn is a 23 y.o. female patient with a chief complaint of \" I need instruction on how to take  my medicines.\"  Chief Complaint   Patient presents with    Bipolar    Med Management     Patient presenting to outpatient treatment for a scheduled psych follow up visit.  HPI  Background:     Patient was seen a couple of weeks ago for management of mood, anxiety, and bipolar symptoms.  Patient reports current drug regimen is working alleviating anxiety and mood dysregulation symptoms. Reports she has been taking 7.5 mg of the Abilify once a day.  Patient hopes to maintain current euthymic states.    Characteristics/Recent psychiatric symptoms (pertinent positives and negatives):    Patient reports she is no longer experiencing manic episodes, and denies anxiety symptoms.   Patient ranks the severity of anxiety using the DOMO 7 scale with a rating of 1 for  anxiety symptoms. Patient  denies depressive symptoms. Patient does explain that current dose of Zoloft, Abilify, Buspar and Hydroxyzine has been " "successful in the management of anxiety and depressive symptoms.     NOTE: Symptom scale is rated where 0 = no symptoms at all, and 10 = symptoms so severe that pt is an imminent danger to themselves or others and requires hospitalization.    Anxiety, Depression, Mood dysregulation, Focus issues, and Sleep disturbances remain(s) present less days than not, which has improved over the past few weeks. Nelson Osborn rates the severity of psych symptoms as a 1/10, noting symptom improvement with taking medications and worsening of symptoms with \" Just life. \"      Psychiatric Review Of Systems:    Depressive Symptoms: negative  Manic Symptoms: negative  Anxiety Symptoms: Negative  Psychotic Symptoms: negative  Other Symptoms:  -Sleep/Energy/Motivation:  \" Sometimes I wake up in the middle of the night. \"  -Appetite/Weight Changes:  It is good  -Psychosis: Denies  -SI/HI: Denies        Current Medications:    Current Outpatient Medications:     ARIPiprazole (Abilify) 15 mg tablet, Take 1 tablet (15 mg) by mouth once daily. Take half a dose ( 7.5 mg) in the morning, Disp: 30 tablet, Rfl: 0    busPIRone (Buspar) 5 mg tablet, Take 1 tablet (5 mg) by mouth 2 times a day. Take one pill in the morning and one pill in the afternoon, Disp: 60 tablet, Rfl: 0    hydrOXYzine pamoate (Vistaril) 25 mg capsule, Take 1 capsule (25 mg) by mouth 3 times a day as needed for anxiety. Take 1 pill every 8 hours as needed for anxiety, Disp: 90 capsule, Rfl: 0    melatonin 10 mg tablet, Take 1 tablet (10 mg) by mouth once daily. Take 1 pill before bed, Disp: 30 tablet, Rfl: 0    prazosin (Minipress) 1 mg capsule, Take 1 capsule (1 mg) by mouth once daily at bedtime. Take 1 pill at bed time, Disp: 30 capsule, Rfl: 0    sertraline (Zoloft) 50 mg tablet, Take 1 tablet (50 mg) by mouth once daily. Take 1 pill in the morning, Disp: 30 tablet, Rfl: 0    naltrexone (Depade) 50 mg tablet, Take 1 tablet (50 mg) by mouth once daily at bedtime., Disp: , " Rfl:      Medical History:  History reviewed. No pertinent past medical history.  Surgical History:  Past Surgical History:   Procedure Laterality Date    APPENDECTOMY      HERNIA REPAIR      WISDOM TOOTH EXTRACTION       Family History:  Family History   Problem Relation Name Age of Onset    Hypertension Mother      Colon polyps Mother      Sleep apnea Father      Other (dm 2) Maternal Grandmother      Colon cancer Maternal Grandfather      Sleep apnea Paternal Grandmother       Social History:  Social History     Socioeconomic History    Marital status: Single     Spouse name: Not on file    Number of children: Not on file    Years of education: Not on file    Highest education level: Not on file   Occupational History    Not on file   Tobacco Use    Smoking status: Every Day     Passive exposure: Current    Smokeless tobacco: Never    Tobacco comments:     Vapes daily   Vaping Use    Vaping status: Every Day   Substance and Sexual Activity    Alcohol use: Not Currently    Drug use: Not Currently     Types: MDMA (ecstacy), Cocaine     Comment: history of coacaine, ecstacy and marijuana use    Sexual activity: Not on file   Other Topics Concern    Not on file   Social History Narrative    Not on file     Social Determinants of Health     Financial Resource Strain: Not on file   Food Insecurity: Not on file   Transportation Needs: Not on file   Physical Activity: Not on file   Stress: Not on file   Social Connections: Not on file   Intimate Partner Violence: Not on file   Housing Stability: Not on file     Vitals:  Vitals:    05/03/24 1317   BP: 105/64   Pulse: 77   Resp: 16   Temp: 36.8 °C (98.3 °F)       Allergies:  No Known Allergies    -PCP: Romina Cloud MD      REVIEW OF SYSTEMS  Review of Systems   Constitutional: Negative.    Psychiatric/Behavioral: Negative.       Objective   Mental Status Exam    Appearance: Well groomed    Attitude: Calm, cooperative, and engaged in conversation.    Behavior:  "Appropriate eye contact.     Motor Activity: No psychomotor agitation or retardation. No abnormal movements, tremors or tics. No evidence of extrapyramidal symptoms or tardive dyskinesia.    Speech: Regular rate, rhythm, volume. Spontaneous, no pressured speech.    Mood:  \" I am doing fine \"    Affect: Euthymic, full range, mood congruent.    Thought Process: Linear, logical, and goal-directed. No loose associations or gross thought disorganization.    Thought Content: Denied current suicidal ideation or thoughts of harm to self, denied homicidal ideation or thoughts of harm to others. No delusional thinking elicited. No perseverations or obsessions identified.     Perception: Did not endorse auditory or visual hallucinations, did not appear to be responding to hallucinatory stimuli.     Cognition: Alert, oriented x3. Preserved attention span and concentration, recent and remote memory. Adequate fund of knowledge. No deficits in language.     Insight: Fair, in regards to understanding mental health condition    Judgement: Fair        Physical Exam      MEDICAL-DECISION MAKING    Patient educated and verbalized understanding on benefits, risks, and side effects of current medications. Outside referrals for psychotherapy sent to patient via [employer]. Continue IOP. Follow-up with psychiatry in 3 weeks for medication evaluation and effectiveness.        Psych med regimen as follows:   1. Aripiprazole 15 mg, take half the dose (7.5 mg) in the morning              2. BusPIRone 5 mg, take 1 pill in the morning and 1 pill in the afternoon around 4 pm  3. HydrOXYzine 25 mg, take 1 pill every 8 hours as needed for anxiety  4. Melatonin 10 mg, take 1 pill before bed  5. Prazosin 1 mg, take 1 pill at bedtime  6. Sertraline 50 mg, take 1 pill in the morning    KATHY  Nelson was seen today for bipolar and med management.  Diagnoses and all orders for this visit:  Bipolar 1 disorder (Multi) (Primary)  -     ARIPiprazole " (Abilify) 15 mg tablet; Take 1 tablet (15 mg) by mouth once daily. Take half a dose ( 7.5 mg) in the morning  -     sertraline (Zoloft) 50 mg tablet; Take 1 tablet (50 mg) by mouth once daily. Take 1 pill in the morning  PTSD (post-traumatic stress disorder)  -     prazosin (Minipress) 1 mg capsule; Take 1 capsule (1 mg) by mouth once daily at bedtime. Take 1 pill at bed time  DOMO (generalized anxiety disorder)  -     busPIRone (Buspar) 5 mg tablet; Take 1 tablet (5 mg) by mouth 2 times a day. Take one pill in the morning and one pill in the afternoon  -     hydrOXYzine pamoate (Vistaril) 25 mg capsule; Take 1 capsule (25 mg) by mouth 3 times a day as needed for anxiety. Take 1 pill every 8 hours as needed for anxiety  -     melatonin 10 mg tablet; Take 1 tablet (10 mg) by mouth once daily. Take 1 pill before bed  -     sertraline (Zoloft) 50 mg tablet; Take 1 tablet (50 mg) by mouth once daily. Take 1 pill in the morning         SI/HI ASSESSMENT  -Risk Assessment: The pt is currently a low acute risk of suicide and self-harm due to no past suicide attempt(s) and not currently endorsing thoughts of suicide. The pt is currently a low acute risk of violence and harm to others due to no past history of violence and not currently threatening others.  -Suicidal Risk Factors: substance abuse  -Violence Risk Factors: substance abuse  -Protective Factors: fear of suicide  -Plan to Reduce Risk: Establish medication regimen and outpatient follow-up care  Denied current suicidal ideation or thoughts of harm to self, denied homicidal ideation or thoughts of harm to others. No delusional thinking elicited. No perseverations or obsessions identified.       PLAN  -Take Aripiprazole 7.5 mg in the morning.  Continue Medications as directed.  -Follow-up with this provider in 3 weeks.  -Risks/benefits/assessment of medication interventions discussed with pt; pt agreeable to plan. Will continue to monitor for symptoms mgmt and SEs and  adjust plan as needed.  -MI to increase coping skills/behavior regulation.  -Safety plan reviewed.  -Call  Psychiatry at [phone] with issues.  -For [facility] residents, [employer] is a 24/7 hotline you can call for assistance at [phone]. Please call 911 or go to your closest Emergency Room if you feel worse. This includes thoughts of hurting yourself or anyone else, or having other troubles such as hearing voices, seeing visions, or having new and scary thoughts about the people around you.      OARRS:  CHAPINCITO Curtis-CNP on 5/3/2024  1:35 PM  I have personally reviewed the OARRS report for Nelson Anurag. I have considered the risks of abuse, dependence, addiction and diversion  Medication is felt to be clinically appropriate based on documented diagnosis.    Recent Results (from the past 8760 hour(s))   Confirmation Opiate/Opioid/Benzo Prescription Compliance    Collection Time: 04/29/24 12:01 PM   Result Value Ref Range    Clonazepam <25 <25 ng/mL    7-Aminoclonazepam <25 <25 ng/mL    Alprazolam <25 <25 ng/mL    Alpha-Hydroxyalprazolam <25 <25 ng/mL    Midazolam <25 <25 ng/mL    Alpha-Hydroxymidazolam <25 <25 ng/mL    Chlordiazepoxide <25 <25 ng/mL    Diazepam <25 <25 ng/mL    Nordiazepam <25 <25 ng/mL    Temazepam <25 <25 ng/mL    Oxazepam <25 <25 ng/mL    Lorazepam <25 <25 ng/mL    Methadone <25 <25 ng/mL    EDDP <25 <25 ng/mL    6-Acetylmorphine <25 <25 ng/mL    Codeine <50 <50 ng/mL    Hydrocodone <25 <25 ng/mL    Hydromorphone <25 <25 ng/mL    Morphine  <50 <50 ng/mL    Norhydrocodone <25 <25 ng/mL    Noroxycodone <25 <25 ng/mL    Oxycodone <25 <25 ng/mL    Oxymorphone <25 <25 ng/mL    Fentanyl <2.5 <2.5 ng/mL    Norfentanyl <2.5 <2.5 ng/mL    Tramadol <50 <50 ng/mL    O-Desmethyltramadol <50 <50 ng/mL    Zolpidem <25 <25 ng/mL    Zolpidem Metabolite (ZCA) <25 <25 ng/mL   Screen Opiate/Opioid/Benzo Prescription Compliance    Collection Time: 04/29/24 12:01 PM   Result Value Ref  Range    Creatinine, Urine Random 43.1 20.0 - 320.0 mg/dL    Amphetamine Screen, Urine Presumptive Negative Presumptive Negative    Barbiturate Screen, Urine Presumptive Negative Presumptive Negative    Cannabinoid Screen, Urine Presumptive Negative Presumptive Negative    Cocaine Metabolite Screen, Urine Presumptive Positive (A) Presumptive Negative    PCP Screen, Urine Presumptive Negative Presumptive Negative     Fabby Nayak, APRN-CNP  Record Review: moderate        Time Spent:  Prep: 0 minutes  Direct time: 30 minutes  Documentation: 10 minutes  Total: 40 minutes

## 2024-05-03 NOTE — PROGRESS NOTES
60 min treatment planning note  Initially met with pt to review progress on previous assigned behavioral tasks. She did not complete these but agreed to follow through 'this weekend'. Was given information on developing her portion of a family contract. Reviewed with patient the previous afternoon's discussion with pt's mother.    Mother and father then joined session to review current treatment recommendations and address various questions they had regarding pt.'s care. Staff clarified treatment recommendations for the family. They were given an appointment for counseling for [place] to address possible family contract, and to support them with this process given pt.'s severity of addiction and recent relapse. Mother would like family communication facilitated and appeared exteremly anxious. She was reassured that parents will meet with counselor initially to gather more information and to discuss possible family contract and recovery process first.   Appointment was facilitated to review psych meds with provider who scheduled to see the family this afternoon. HALI ere obtained so some treatment records can be obtained. Pt. Became increasingly irritated with mother as she continued to insist on efforts to control the outcome. The session continued and staff had to redirect in order to remain on task.

## 2024-05-03 NOTE — GROUP NOTE
Group Topic: Coping Skills   Group Date: 5/3/2024  Start Time: 10:00 AM  End Time: 11:00 AM  Facilitators: OCHOA Rich   [facility]    Number of Participants: 2   Treatment Modality: Cognitive Behavioral Therapy, Patient-Centered Therapy, and Psychoeducation  Interventions utilized were clarification, patient education, and support  Purpose: coping skills, maladaptive thinking, and feelings  Comment: Group Counseling  Group began with a meditative reading and pts. were encouraged to reflect and respond. Members were encouraged to provide the group with an update regarding their recovery progress, as well as discuss shared experiences and observations.  Topics of today's discussion included:  managing anxiety using CBT skills and managing shame.   Sought support from group and shared important information that was increasing her anxiety.   Name: Nelson Osborn YOB: 2000   MR: [id number]      Level of Participation: active  Quality of Participation: appropriate/pleasant and attentive  Mood/Affect: anxious  Progress: Significant  Plan: continue with services

## 2024-05-03 NOTE — GROUP NOTE
Group Topic: Dialectical Behavioral Therapy - Mindfulness   Group Date: 5/3/2024  Start Time:  9:00 AM  End Time: 10:00 AM  Facilitators: OCHOA Rich   [facility]    Number of Participants: 2   Treatment Modality: Dialectical Behavioral Therapy  Interventions utilized were group exercise and support  Purpose: coping skills, maladaptive thinking, and relapse prevention strategies  Comment: Mindfulness and check in  Group began with a meditative reading and pts. were encouraged to reflect and respond.  This was followed by a brief explanation of the potential benefits of mindfulness.  An experiential exercise was completed and afterwards members were asked to share a non-judgmental observation about their experience as well as a daily gratitude. Members shared an update regarding recovery progress.     Name: Nelson Osborn YOB: 2000   MR: [id number]      Level of Participation: moderate  Quality of Participation: appropriate/pleasant and drowsy  Mood/Affect: anxious  Progress: Moderate  Plan: continue with services

## 2024-05-03 NOTE — GROUP NOTE
Group Topic: Chemical Dependency - Primary Disease   Group Date: 5/3/2024  Start Time: 11:00 AM  End Time: 12:00 PM  Facilitators: OCHOA Rich   [facility]    Number of Participants: 2   Treatment Modality: Psychoeducation  Interventions utilized were patient education  Purpose: insight or knowledge and relapse prevention strategies  Comment: Neurobiology of addiction-Dr. [place] pt. 2    Pt. 2 of Dr. Marrufo's video regarding benefits of recovery, applicable to working a 12 step program and further explaining how recovery changes the brain. Patients viewed second portion of video presentation by Dr. Radha Marrufo, addiction psychiatrist, covering this content. The content of the first portion of the video [disease/neurobiology] , previously viewed by several group members, was reviewed through education and group discussion as well. Review of key concepts and application of these held afterwards.     Name: Nelson Osborn YOB: 2000   MR: [id number]      Level of Participation: minimal  Quality of Participation: appropriate/pleasant; drowsy  Mood/Affect: anxious  Progress: Moderate  Plan: continue with services

## 2024-05-04 ENCOUNTER — APPOINTMENT (OUTPATIENT)
Dept: CARDIOLOGY | Facility: HOSPITAL | Age: 24
End: 2024-05-04
Payer: OTHER GOVERNMENT

## 2024-05-04 VITALS
TEMPERATURE: 97.5 F | OXYGEN SATURATION: 97 % | BODY MASS INDEX: 19.99 KG/M2 | HEART RATE: 66 BPM | RESPIRATION RATE: 16 BRPM | DIASTOLIC BLOOD PRESSURE: 52 MMHG | HEIGHT: 65 IN | SYSTOLIC BLOOD PRESSURE: 98 MMHG | WEIGHT: 120 LBS

## 2024-05-04 LAB
ALBUMIN SERPL BCP-MCNC: 4.7 G/DL (ref 3.4–5)
ALP SERPL-CCNC: 46 U/L (ref 33–110)
ALT SERPL W P-5'-P-CCNC: 13 U/L (ref 7–45)
AMPHETAMINES UR QL SCN: ABNORMAL
AMPHETAMINES UR QL SCN: NORMAL
ANION GAP SERPL CALC-SCNC: 12 MMOL/L (ref 10–20)
APAP SERPL-MCNC: <10 UG/ML
AST SERPL W P-5'-P-CCNC: 15 U/L (ref 9–39)
B-HCG SERPL-ACNC: <2 MIU/ML
BARBITURATES UR QL SCN: ABNORMAL
BARBITURATES UR QL SCN: NORMAL
BASOPHILS # BLD AUTO: 0.06 X10*3/UL (ref 0–0.1)
BASOPHILS NFR BLD AUTO: 0.4 %
BENZODIAZ UR QL SCN: ABNORMAL
BENZODIAZ UR QL SCN: NORMAL
BILIRUB SERPL-MCNC: 0.4 MG/DL (ref 0–1.2)
BUN SERPL-MCNC: 10 MG/DL (ref 6–23)
BZE UR QL SCN: ABNORMAL
BZE UR QL SCN: NORMAL
CALCIUM SERPL-MCNC: 8.8 MG/DL (ref 8.6–10.3)
CANNABINOIDS UR QL SCN: ABNORMAL
CANNABINOIDS UR QL SCN: NORMAL
CARDIAC TROPONIN I PNL SERPL HS: <3 NG/L (ref 0–13)
CHLORIDE SERPL-SCNC: 109 MMOL/L (ref 98–107)
CO2 SERPL-SCNC: 22 MMOL/L (ref 21–32)
CREAT SERPL-MCNC: 0.62 MG/DL (ref 0.5–1.05)
EGFRCR SERPLBLD CKD-EPI 2021: >90 ML/MIN/1.73M*2
EOSINOPHIL # BLD AUTO: 0.02 X10*3/UL (ref 0–0.7)
EOSINOPHIL NFR BLD AUTO: 0.1 %
ERYTHROCYTE [DISTWIDTH] IN BLOOD BY AUTOMATED COUNT: 13.5 % (ref 11.5–14.5)
ETHANOL SERPL-MCNC: 97 MG/DL
FENTANYL+NORFENTANYL UR QL SCN: ABNORMAL
FENTANYL+NORFENTANYL UR QL SCN: NORMAL
GLUCOSE SERPL-MCNC: 84 MG/DL (ref 74–99)
HCG UR QL IA.RAPID: NEGATIVE
HCT VFR BLD AUTO: 36.4 % (ref 36–46)
HGB BLD-MCNC: 12.4 G/DL (ref 12–16)
IMM GRANULOCYTES # BLD AUTO: 0.03 X10*3/UL (ref 0–0.7)
IMM GRANULOCYTES NFR BLD AUTO: 0.2 % (ref 0–0.9)
LYMPHOCYTES # BLD AUTO: 2.26 X10*3/UL (ref 1.2–4.8)
LYMPHOCYTES NFR BLD AUTO: 16.6 %
MCH RBC QN AUTO: 29.7 PG (ref 26–34)
MCHC RBC AUTO-ENTMCNC: 34.1 G/DL (ref 32–36)
MCV RBC AUTO: 87 FL (ref 80–100)
METHADONE UR QL SCN: ABNORMAL
METHADONE UR QL SCN: NORMAL
MONOCYTES # BLD AUTO: 0.48 X10*3/UL (ref 0.1–1)
MONOCYTES NFR BLD AUTO: 3.5 %
NEUTROPHILS # BLD AUTO: 10.73 X10*3/UL (ref 1.2–7.7)
NEUTROPHILS NFR BLD AUTO: 79.2 %
NRBC BLD-RTO: 0 /100 WBCS (ref 0–0)
OPIATES UR QL SCN: ABNORMAL
OPIATES UR QL SCN: NORMAL
OXYCODONE+OXYMORPHONE UR QL SCN: ABNORMAL
OXYCODONE+OXYMORPHONE UR QL SCN: NORMAL
PCP UR QL SCN: ABNORMAL
PCP UR QL SCN: NORMAL
PLATELET # BLD AUTO: 320 X10*3/UL (ref 150–450)
POTASSIUM SERPL-SCNC: 3.8 MMOL/L (ref 3.5–5.3)
PROT SERPL-MCNC: 7 G/DL (ref 6.4–8.2)
RBC # BLD AUTO: 4.18 X10*6/UL (ref 4–5.2)
SALICYLATES SERPL-MCNC: <3 MG/DL
SARS-COV-2 RNA RESP QL NAA+PROBE: NOT DETECTED
SODIUM SERPL-SCNC: 139 MMOL/L (ref 136–145)
WBC # BLD AUTO: 13.6 X10*3/UL (ref 4.4–11.3)

## 2024-05-04 PROCEDURE — 84702 CHORIONIC GONADOTROPIN TEST: CPT | Performed by: STUDENT IN AN ORGANIZED HEALTH CARE EDUCATION/TRAINING PROGRAM

## 2024-05-04 PROCEDURE — 84075 ASSAY ALKALINE PHOSPHATASE: CPT | Performed by: EMERGENCY MEDICINE

## 2024-05-04 PROCEDURE — 81025 URINE PREGNANCY TEST: CPT | Performed by: EMERGENCY MEDICINE

## 2024-05-04 PROCEDURE — 80179 DRUG ASSAY SALICYLATE: CPT | Performed by: EMERGENCY MEDICINE

## 2024-05-04 PROCEDURE — 2500000004 HC RX 250 GENERAL PHARMACY W/ HCPCS (ALT 636 FOR OP/ED): Performed by: STUDENT IN AN ORGANIZED HEALTH CARE EDUCATION/TRAINING PROGRAM

## 2024-05-04 PROCEDURE — 2500000004 HC RX 250 GENERAL PHARMACY W/ HCPCS (ALT 636 FOR OP/ED)

## 2024-05-04 PROCEDURE — 85025 COMPLETE CBC W/AUTO DIFF WBC: CPT | Performed by: EMERGENCY MEDICINE

## 2024-05-04 PROCEDURE — 82077 ASSAY SPEC XCP UR&BREATH IA: CPT | Performed by: EMERGENCY MEDICINE

## 2024-05-04 PROCEDURE — 80307 DRUG TEST PRSMV CHEM ANLYZR: CPT | Performed by: EMERGENCY MEDICINE

## 2024-05-04 PROCEDURE — 96372 THER/PROPH/DIAG INJ SC/IM: CPT | Performed by: STUDENT IN AN ORGANIZED HEALTH CARE EDUCATION/TRAINING PROGRAM

## 2024-05-04 PROCEDURE — 80143 DRUG ASSAY ACETAMINOPHEN: CPT | Performed by: EMERGENCY MEDICINE

## 2024-05-04 PROCEDURE — 84484 ASSAY OF TROPONIN QUANT: CPT | Performed by: EMERGENCY MEDICINE

## 2024-05-04 PROCEDURE — 93005 ELECTROCARDIOGRAM TRACING: CPT

## 2024-05-04 PROCEDURE — 96372 THER/PROPH/DIAG INJ SC/IM: CPT

## 2024-05-04 PROCEDURE — 36415 COLL VENOUS BLD VENIPUNCTURE: CPT | Performed by: STUDENT IN AN ORGANIZED HEALTH CARE EDUCATION/TRAINING PROGRAM

## 2024-05-04 PROCEDURE — 87635 SARS-COV-2 COVID-19 AMP PRB: CPT | Performed by: EMERGENCY MEDICINE

## 2024-05-04 RX ORDER — LORAZEPAM 1 MG/1
1 TABLET ORAL ONCE
Status: DISCONTINUED | OUTPATIENT
Start: 2024-05-04 | End: 2024-05-04

## 2024-05-04 RX ORDER — HALOPERIDOL 5 MG/ML
5 INJECTION INTRAMUSCULAR ONCE
Status: COMPLETED | OUTPATIENT
Start: 2024-05-04 | End: 2024-05-04

## 2024-05-04 RX ORDER — LORAZEPAM 2 MG/ML
INJECTION INTRAMUSCULAR
Status: COMPLETED
Start: 2024-05-04 | End: 2024-05-04

## 2024-05-04 RX ORDER — LORAZEPAM 2 MG/ML
1 INJECTION INTRAMUSCULAR ONCE
Status: COMPLETED | OUTPATIENT
Start: 2024-05-04 | End: 2024-05-04

## 2024-05-04 RX ADMIN — LORAZEPAM 1 MG: 2 INJECTION, SOLUTION INTRAMUSCULAR; INTRAVENOUS at 00:48

## 2024-05-04 RX ADMIN — LORAZEPAM 1 MG: 2 INJECTION INTRAMUSCULAR at 00:48

## 2024-05-04 RX ADMIN — HALOPERIDOL LACTATE 5 MG: 5 INJECTION, SOLUTION INTRAMUSCULAR at 01:19

## 2024-05-04 SDOH — HEALTH STABILITY: MENTAL HEALTH: WISH TO BE DEAD (PAST 1 MONTH): YES

## 2024-05-04 SDOH — HEALTH STABILITY: MENTAL HEALTH: IN THE PAST FEW WEEKS, HAVE YOU FELT THAT YOU OR YOUR FAMILY WOULD BE BETTER OFF IF YOU WERE DEAD?: YES

## 2024-05-04 SDOH — HEALTH STABILITY: MENTAL HEALTH: NON-SPECIFIC ACTIVE SUICIDAL THOUGHTS (PAST 1 MONTH): NO

## 2024-05-04 SDOH — HEALTH STABILITY: MENTAL HEALTH: ANXIETY SYMPTOMS: GENERALIZED

## 2024-05-04 SDOH — HEALTH STABILITY: MENTAL HEALTH: BEHAVIORS/MOOD: CALM;COOPERATIVE

## 2024-05-04 SDOH — HEALTH STABILITY: MENTAL HEALTH: NEEDS EXPRESSED: EMOTIONAL

## 2024-05-04 SDOH — HEALTH STABILITY: MENTAL HEALTH: BEHAVIORS/MOOD: AGITATED;ANXIOUS;ANGRY

## 2024-05-04 SDOH — HEALTH STABILITY: MENTAL HEALTH: IN THE PAST FEW WEEKS, HAVE YOU WISHED YOU WERE DEAD?: YES

## 2024-05-04 SDOH — SOCIAL STABILITY: SOCIAL NETWORK: PARENT/GUARDIAN/SIGNIFICANT OTHER INVOLVEMENT: ATTENTIVE TO PATIENT NEEDS

## 2024-05-04 SDOH — SOCIAL STABILITY: SOCIAL NETWORK: EMOTIONAL SUPPORT GIVEN: REASSURE

## 2024-05-04 SDOH — HEALTH STABILITY: MENTAL HEALTH: ACTIVE SUICIDAL IDEATION WITH SPECIFIC PLAN AND INTENT (PAST 1 MONTH): NO

## 2024-05-04 SDOH — HEALTH STABILITY: MENTAL HEALTH: CONTENT: BLAMING OTHERS

## 2024-05-04 SDOH — HEALTH STABILITY: MENTAL HEALTH: BEHAVIORS/MOOD: AGITATED;ANXIOUS;ANGRY;APPEARS INTOXICATED

## 2024-05-04 SDOH — HEALTH STABILITY: MENTAL HEALTH

## 2024-05-04 SDOH — HEALTH STABILITY: MENTAL HEALTH: HAVE YOU EVER TRIED TO KILL YOURSELF?: YES

## 2024-05-04 SDOH — SOCIAL STABILITY: SOCIAL INSECURITY: FAMILY BEHAVIORS: APPROPRIATE FOR SITUATION;SUPPORTIVE

## 2024-05-04 SDOH — HEALTH STABILITY: MENTAL HEALTH: ACTIVE SUICIDAL IDEATION WITH SOME INTENT TO ACT, WITHOUT SPECIFIC PLAN (PAST 1 MONTH): NO

## 2024-05-04 SDOH — HEALTH STABILITY: MENTAL HEALTH: SUICIDAL BEHAVIOR (3 MONTHS): NO

## 2024-05-04 SDOH — HEALTH STABILITY: MENTAL HEALTH: ARE YOU HAVING THOUGHTS OF KILLING YOURSELF RIGHT NOW?: NO

## 2024-05-04 SDOH — HEALTH STABILITY: MENTAL HEALTH: DEPRESSION SYMPTOMS: CRYING

## 2024-05-04 SDOH — HEALTH STABILITY: MENTAL HEALTH: SUICIDAL BEHAVIOR (LIFETIME): YES

## 2024-05-04 SDOH — HEALTH STABILITY: MENTAL HEALTH: IN THE PAST WEEK, HAVE YOU BEEN HAVING THOUGHTS ABOUT KILLING YOURSELF?: NO

## 2024-05-04 ASSESSMENT — LIFESTYLE VARIABLES
HAVE YOU EVER FELT YOU SHOULD CUT DOWN ON YOUR DRINKING: NO
EVER FELT BAD OR GUILTY ABOUT YOUR DRINKING: NO
TOTAL SCORE: 0
EVER HAD A DRINK FIRST THING IN THE MORNING TO STEADY YOUR NERVES TO GET RID OF A HANGOVER: NO
SUBSTANCE_ABUSE_PAST_12_MONTHS: YES
HAVE PEOPLE ANNOYED YOU BY CRITICIZING YOUR DRINKING: NO

## 2024-05-04 ASSESSMENT — PAIN SCALES - GENERAL
PAINLEVEL_OUTOF10: 0 - NO PAIN
PAINLEVEL_OUTOF10: 0 - NO PAIN

## 2024-05-04 NOTE — PROGRESS NOTES

## 2024-05-04 NOTE — ED PROVIDER NOTES
HPI   Chief Complaint   Patient presents with    Suicidal       This is a 23 years old female patient presented to the emergency department with her parents with a concern of substance abuse as well as suicidal ideation, visual, and auditory hallucination.  Parents stated that she has history of bipolar disorder, PTSD, and she is traumatized from an experience in a psych facility in [place].  Patient denies any physical complaints such as headache, neck pain, chest pain, abdominal pain, weakness, or numbness.  Denies any suicidal, homicidal ideation, visual, or auditory hallucination.  She was crack cocaine last night.  Denies any headache, lightheadedness, dizziness.    Review of system: As stated above in the HPI section.                          Akbar Coma Scale Score: 15                     Patient History   Past Medical History:   Diagnosis Date    Anxiety     Bipolar 1 disorder (Multi)     Depression      Past Surgical History:   Procedure Laterality Date    APPENDECTOMY      HERNIA REPAIR      WISDOM TOOTH EXTRACTION       Family History   Problem Relation Name Age of Onset    Hypertension Mother      Colon polyps Mother      Sleep apnea Father      Other (dm 2) Maternal Grandmother      Colon cancer Maternal Grandfather      Sleep apnea Paternal Grandmother       Social History     Tobacco Use    Smoking status: Every Day     Passive exposure: Current    Smokeless tobacco: Never    Tobacco comments:     Vapes daily   Vaping Use    Vaping status: Every Day   Substance Use Topics    Alcohol use: Not Currently    Drug use: Not Currently     Types: MDMA (ecstacy), Cocaine     Comment: history of coacaine, ecstacy and marijuana use       Physical Exam   ED Triage Vitals [05/03/24 2359]   Temperature Heart Rate Respirations BP   36.7 °C (98.1 °F) 95 20 116/69      Pulse Ox Temp Source Heart Rate Source Patient Position   97 % Oral -- --      BP Location FiO2 (%)     -- --       Physical Exam  Vitals and nursing  note reviewed.   Constitutional:       General: She is not in acute distress.     Appearance: She is well-developed.   HENT:      Head: Normocephalic and atraumatic.   Eyes:      Conjunctiva/sclera: Conjunctivae normal.   Cardiovascular:      Rate and Rhythm: Normal rate and regular rhythm.      Heart sounds: No murmur heard.  Pulmonary:      Effort: Pulmonary effort is normal. No respiratory distress.      Breath sounds: Normal breath sounds.   Abdominal:      Palpations: Abdomen is soft.      Tenderness: There is no abdominal tenderness.   Musculoskeletal:         General: No swelling.      Cervical back: Neck supple.   Skin:     General: Skin is warm and dry.      Capillary Refill: Capillary refill takes less than 2 seconds.   Neurological:      Mental Status: She is alert.   Psychiatric:      Comments: Patient is agitated, tearful         ED Course & MDM   ED Course as of 05/04/24 2207   Sat May 04, 2024   0946 Alfred from [facility] contacted and reported will talk with patient. [ES]      ED Course User Index  [ES] Juni Virk MD         Diagnoses as of 05/04/24 2207   Agitation       Medical Decision Making  Patient is seen and examined, labs are unremarkable, patient is cleared medically for EPAT to evaluate her.  EPAT recommending discharging the patient home.  Will offer the patient [facility] consult and assessment after she wakes up.  Patient received Ativan and Haldol given her agitation upon arrival to the emergency department.  Will sign out the patient care at this point to the morning staff pending the above plan        Procedure  Procedures     Leeroy Michael, DO  05/04/24 0655       Leeroy Michael,   05/04/24 2207

## 2024-05-04 NOTE — PROGRESS NOTES
EPAT - Social Work Psychiatric Assessment    Arrival Details  Mode of Arrival: Ambulatory  Admission Source: Home  Admission Type: Voluntary  EPAT Assessment Start Date: 05/04/24  EPAT Assessment Start Time: 0356  Name of : LAURA Obregon LSW    History of Present Illness  Admission Reason: Detox/Rehab, passive statement about not being alive in ED  HPI: Patient is a 23 year old [place] female, presenting to the ED with concern for SI. ED triage note indicates the pt was intoxicated on cocaine and alcohol, and made the statement “I just can’t do this anymore.” When asked about SI by triage nurse, pt stated “just answer yes to all the questions,” and was otherwise uncooperative. Pt was reportedly tearful and attempting to elope, therefore she was administered IM Ativan 1mg at 0048 and Haldol 5mg at 0119.      Further review of patient’s chart reflects a history of Bipolar D/O, DOMO, PTSD, Alcohol Use D/O, and Cocaine Use D/O, and engagement with  Dual Diagnosis IOP program from 4/22/24 to present, and a linkage to ongoing outpatient psychiatry services at [facility]. Chart also reflects an ED visit in December 2016, at which time pt had taken 5 to 10 Excedrin tablets and cut her forearm in response to increased relationship stressors. Once medically cleared, pt was psychiatrically admitted to [place] for one day, as she reportedly regretted the ingestion and denied continued SI. Pt’s parents reported to this writer that the pt has 3 additional IP psych admissions out of state over the past several months, and has made comments about other past suicide attempts, but further details are unknown.    SW Readmission Information   Readmission within 30 Days: No    Psychiatric Symptoms  Anxiety Symptoms: Generalized  Depression Symptoms: Crying (Passive thoughts of not being alive.)  Rebekah Symptoms: No problems reported or observed.    Psychosis Symptoms  Hallucination Type: No problems reported  or observed.  Delusion Type: No problems reported or observed.    Additional Symptoms - Adult  Generalized Anxiety Disorder: Excessive anxiety/worry  Obsessive Compulsive Disorder: No problems reported or observed.  Panic Attack: No problems reported or observed.  Post Traumatic Stress Disorder: No problems reported or observed.  Delirium: No problems reported or observed.    Past Psychiatric History:   Psychiatric Diagnosis: Hx of Bipolar D/O, DOMO, PTSD, Alcohol Use D/O, Cocaine Use D/O    OP Mental Health Tx:  Psychiatry- [facility] and Current Dual Dx IOP Program    IP Psych Admissions: Parents report once in [place] 1/2024 and 2x in [place] 10/2023 and 12/2023. Per chart.  [place] 12/4-12/5/24    Self-harm or Suicide Attempts: Hx of cutting as SIB. SA via OD on pills 12/4/16.    Past Psychiatric Meds/Treatments: Per OP note from 5/3/24:  ARIPiprazole (Abilify) 15 mg tablet, Take 1 tablet (15 mg) by mouth once daily. Take half a dose ( 7.5 mg) in the morning    busPIRone (Buspar) 5 mg tablet, Take one pill in the morning and one pill in the afternoon.    hydrOXYzine pamoate (Vistaril) 25 mg capsule, Take 1 pill every 8 hours as needed for anxiety.    melatonin 10 mg tablet, Take 1 tablet (10 mg) by mouth before bed.    prazosin (Minipress) 1 mg capsule, Take 1 capsule (1 mg) by mouth once daily at bedtime.    sertraline (Zoloft) 50 mg tablet, Take 1 pill in the morning.    naltrexone (Depade) 50 mg tablet, Take 1 tablet (50 mg) by mouth once daily at bedtime.    Past Violence/Victimization History: None, per family and chart.    Current Mental Health Contacts   Name/Phone Number: Dual IOP Groups   Last Appointment Date: 5/3/24 (started 4/22/24)  Provider Name/Phone Number: ANATOLY Curtis  Provider Last Appointment Date: 5/3/24, next appt 5/23/24    Support System: Immediate family    Living Arrangement: House, Lives with someone (Mom and dad)      Income  Information  Employment Status for: Patient  Employment Status:  (Unable to assess)  Income Source: Unable to Assess    [place] Service/Education History  Current or Previous  Service: None  Education Level:  (Unable to assess)  History of Learning Problems:  (Unable to assess)  History of School Behavior Problems:  (Unable to assess)    Social/Cultural History  Social History: Heterosexual, never , no kids, raised by mom and dad with older brother, dad was in the  so the family moved every 2 years.  Important Activities: Family, Hobbies    Legal  Legal Considerations:  (None at this time)  Assistance with Managing/Advocating Healthcare Needs:  (None at this time)  Criminal Activity/ Legal Involvement Pertinent to Current Situation/ Hospitalization: Not assessed    Drug Screening  Have you used any substances (canabis, cocaine, heroin, hallucinogens, inhalants, etc.) in the past 12 months?: Yes  Have you used any prescription drugs other than prescribed in the past 12 months?:  (unable to assess)  Is a toxicology screen needed?: Yes    Stage of Change  Stage of Change: Preparation  History of Treatment: Inpatient, IOP, Dual  Type of Treatment Offered:  ([facility])  Treatment Offered:  (Pt came to ED for AOD tx, unable to speak to her directly about [facility], but they will attempt to speak with her when she is more interviewable.)  Duration of Substance Use: unk  Frequency of Substance Use: unk, relapsed last night  Age of First Substance Use: unk    Psychosocial  Psychosocial (WDL): Exceptions to WDL  Behaviors/Mood:  (Pt appeared sober, but was very difficult to keep awake due to meds given in ED. Anxious and slightly irritable when awake.)    Orientation  Orientation Level: Oriented X4    General Appearance  Motor Activity: Unremarkable  Speech Pattern: Excessively loud, Excessively soft (Cycled between the 2)  General Attitude: Uninterested, Cooperative  Appearance/Hygiene:  Unremarkable    Thought Process  Coherency:  (Organized, Linear)  Content: Unremarkable  Delusions:  (None)  Perception: Not altered  Hallucination: None  Judgment/Insight:  (Help-seeking, resourceful)  Confusion: None  Cognition: Appropriate for developmental age, No short term memory loss, No long term memory loss, Follows commands    Risk Factors  Self Harm/Suicidal Ideation Plan: Patient reports thoughts about not being alive, but denied active SI/ method/ planning/ intent.  Previous Self Harm/Suicidal Plans: Hx of cutting as SIB. SA via OD on pills 12/4/16.  Risk Factors: 1st psychiatric hospitalization by age 18, Major mental illness, Substance abuse    Violence Risk Assessment  Assessment of Violence: None noted  Thoughts of Harm to Others: No    Ability to Assess Risk Screen  Risk Screen - Ability to Assess: Able to be screened  Ask Suicide-Screening Questions  1. In the past few weeks, have you wished you were dead?: Yes  2. In the past few weeks, have you felt that you or your family would be better off if you were dead?: Yes  3. In the past week, have you been having thoughts about killing yourself?: No  4. Have you ever tried to kill yourself?: Yes  5. Are you having thoughts of killing yourself right now?: No  Calculated Risk Score: Potential Risk  [place] Suicide Severity Rating Scale (Screener/Recent Self-Report)  1. Wish to be Dead (Past 1 Month): Yes  2. Non-Specific Active Suicidal Thoughts (Past 1 Month): No  3. Active Suicidal Ideation with any Methods (Not Plan) Without Intent to Act (Past 1 Month): No  4. Active Suicidal Ideation with Some Intent to Act, Without Specific Plan (Past 1 Month): No  5. Active Suicidal Ideation with Specific Plan and Intent (Past 1 Month): No  6. Suicidal Behavior (Lifetime): Yes  6. Suicidal Behavior (3 Months): No  Calculated C-SSRS Risk Score (Lifetime/Recent): Moderate Risk  Step 1: Risk Factors  Current & Past Psychiatric Dx: Mood disorder, Alcohol/substance  abuse disorders  Presenting Symptoms: Anxiety and/or panic, Hopelessness or despair  Family History:  (Per chart- Maternal Uncle with hx of Anxiety.)  Precipitants/Stressors: Triggering events leading to humiliation, shame, and/or despair (e.g. loss of relationship, financial or health status) (real or anticipated), Substance intoxication or withdrawal  Change in Treatment:  (None reported.)  Access to Lethal Methods : No  Step 2: Protective Factors   Protective Factors Internal: Identifies reasons for living  Protective Factors External: Supportive social network or family or friends, Positive therapeutic relationships  Step 3: Suicidal Ideation Intensity  How Many Times Have You Had These Thoughts: Once a week  When You Have the Thoughts How Long do They Last : 1-4 hours/a lot of the time  Could/Can You Stop Thinking About Killing Yourself or Wanting to Die if You Want to: Easily able to control thoughts  Are There Things - Anyone or Anything - That Stopped You From Wanting to Die or Acting on: Deterrents definitely stopped you from attempting suicide  What Sort of Reasons Did You Have For Thinking About Wanting to Die or Killing Yourself: Completely to end or stop the pain (you couldn't go on living with the pain or how you were feeling)  Total Score: 12  Step 5: Documentation  Risk Level: Low suicide risk    Assessment and Plan of Care:  Patient was evaluated by this service via telemedicine after ample time to metabolize the drugs and alcohol. Pt did not appear intoxicated, but was difficult to keep awake due to the medication given to her in the ED. Pt was anxious and irritable about having to answer questions, but was able to provide adequate answers to pertinent questions. When asked to explain why she is in the ED, pt stated “drugs.” When confronted with previously reported statements about dying, pt stated that she “doesn't want to be alive anymore.” When asked why, pt stated “drugs, struggling with  addiction.” Pt denied active thoughts to kill herself, any identified method or plan to do so, or intent to do so. When asked about HI, pt stated “yes, everyone here.” This is perceived to be due to pt's frustration with being in the ED and speaking to this writer, versus true HI. Pt does not have any hx of violence. Pt reported compliance with her psych meds.     This writer spoke with patient's parents separately via telephone, who explained that the pt reported to them that she had relapsed last night, and wanted to go to the hospital for help/ detox. Parents report that pt “has had some very bad experiences with rehabs and mental health facilities in other states, so once they arrived to the hospital, pt became very anxious and on edge, and didn't want to go in, they convinced her to go inside, but she continued to be very scared and anxious, she didn't want to answer questions, and at some point made comments about how she 'can't keep going on like this, doesn't like her life' and things like that.” With further discussion, parents report that the pt did not say anything about wanting to kill herself in the ED, or recently. Parents denied any recent reports from pt, or evidence of HI, AVH, or paranoia. Parents reported the belief that pt would still be receptive to AOD treatment once more awake.    Patient is not presenting as an increased risk of harm to herself or anyone else at this time, and presented to the ED for substance use treatment. Pt is cleared from a psychiatric perspective and [facility]. This writer was unable to discuss this with the pt, however her information was given to [facility] staff, who will attempt to speak with pt once she is more awake. Discussed with ED provider.     Diagnosis: Acute Stress Reaction, Alcohol Use D/O, Cocaine Use D/O      Outcome/Disposition  Patient's Perception of Outcome Achieved: unable to assess  Assessment, Recommendations and Risk Level  Reviewed with: Min Stafford DO Resident  Contact Name: Aldo Osborn Awilda, [phone]  Contact Number(s): Sushma Michatul Jaleel, [phone]  EPAT Assessment Completed Date: 05/04/24  EPAT Assessment Completed Time: 0513  Patient Disposition:  ([facility]RIVE vs Home)    Social Work Note

## 2024-05-04 NOTE — ED NOTES
Assumed care of patient. Pt sleeping at this time. Respirations even and unlabored.      Bharati Gonsalez RN  05/04/24 0802

## 2024-05-04 NOTE — ED TRIAGE NOTES
"Pt brought in by parents--pt is currently in IOP program for cocaine and alcohol--she used crack and drank alcohol today and was stating \"I just can't do this anymore\"--previous suicide attempt with tylenol overdose at the age of 16--pt also has a history of cutting behaviors--history of bipolar and anxiety and depression--she was in a rehab in [place] 2 months ago--pt is tearful--requires much prompting to cooperate--trying to walk out the door and stopped by her father and security---when asked if she was suicidal she stated \"just answer yes to all the questions\"     --pt states \"I don't want to answer any more questions after that---she did change into a hospital gown and all belonging removed--cell phone given to her parents to hold  "

## 2024-05-04 NOTE — DISCHARGE INSTRUCTIONS
Please follow-up with your primary care provider.  Avoid using illicit drugs.  Avoid using alcohol.  Return immediately to the emergency department if you develop any agitation, feeling depressed, suicidal, he develop any auditory or visual hallucination or if concerns arise.

## 2024-05-05 LAB
ATRIAL RATE: 76 BPM
BZE UR-MCNC: 260 NG/ML
ETHYL GLUCURONIDE UR CFM-MCNC: 1122 NG/ML
ETHYL SULFATE UR CFM-MCNC: 499 NG/ML
P AXIS: 17 DEGREES
P OFFSET: 185 MS
P ONSET: 131 MS
PR INTERVAL: 178 MS
Q ONSET: 220 MS
QRS COUNT: 12 BEATS
QRS DURATION: 94 MS
QT INTERVAL: 388 MS
QTC CALCULATION(BAZETT): 436 MS
QTC FREDERICIA: 419 MS
R AXIS: 67 DEGREES
T AXIS: 41 DEGREES
T OFFSET: 414 MS
VENTRICULAR RATE: 76 BPM

## 2024-05-06 ENCOUNTER — APPOINTMENT (OUTPATIENT)
Dept: BEHAVIORAL HEALTH | Facility: CLINIC | Age: 24
End: 2024-05-06
Payer: OTHER GOVERNMENT

## 2024-05-07 ENCOUNTER — APPOINTMENT (OUTPATIENT)
Dept: BEHAVIORAL HEALTH | Facility: CLINIC | Age: 24
End: 2024-05-07
Payer: OTHER GOVERNMENT

## 2024-05-08 ENCOUNTER — APPOINTMENT (OUTPATIENT)
Dept: BEHAVIORAL HEALTH | Facility: CLINIC | Age: 24
End: 2024-05-08
Payer: OTHER GOVERNMENT

## 2024-05-09 ENCOUNTER — APPOINTMENT (OUTPATIENT)
Dept: BEHAVIORAL HEALTH | Facility: CLINIC | Age: 24
End: 2024-05-09
Payer: OTHER GOVERNMENT

## 2024-05-10 ENCOUNTER — APPOINTMENT (OUTPATIENT)
Dept: BEHAVIORAL HEALTH | Facility: CLINIC | Age: 24
End: 2024-05-10
Payer: OTHER GOVERNMENT

## 2024-05-13 ENCOUNTER — APPOINTMENT (OUTPATIENT)
Dept: BEHAVIORAL HEALTH | Facility: CLINIC | Age: 24
End: 2024-05-13
Payer: OTHER GOVERNMENT

## 2024-05-23 ENCOUNTER — TELEMEDICINE (OUTPATIENT)
Dept: BEHAVIORAL HEALTH | Facility: CLINIC | Age: 24
End: 2024-05-23
Payer: OTHER GOVERNMENT

## 2024-05-23 DIAGNOSIS — F41.1 GAD (GENERALIZED ANXIETY DISORDER): ICD-10-CM

## 2024-05-23 DIAGNOSIS — F43.10 PTSD (POST-TRAUMATIC STRESS DISORDER): ICD-10-CM

## 2024-05-23 DIAGNOSIS — F31.9 BIPOLAR 1 DISORDER (MULTI): Primary | ICD-10-CM

## 2024-05-23 PROCEDURE — 99214 OFFICE O/P EST MOD 30 MIN: CPT

## 2024-05-23 RX ORDER — SERTRALINE HYDROCHLORIDE 50 MG/1
50 TABLET, FILM COATED ORAL DAILY
Qty: 90 TABLET | Refills: 0 | Status: SHIPPED | OUTPATIENT
Start: 2024-05-23 | End: 2024-08-21

## 2024-05-23 RX ORDER — HYDROXYZINE PAMOATE 25 MG/1
25 CAPSULE ORAL 3 TIMES DAILY PRN
Qty: 90 CAPSULE | Refills: 0 | Status: SHIPPED | OUTPATIENT
Start: 2024-05-23 | End: 2024-08-21

## 2024-05-23 RX ORDER — ACETAMINOPHEN, DIPHENHYDRAMINE HCL, PHENYLEPHRINE HCL 325; 25; 5 MG/1; MG/1; MG/1
10 TABLET ORAL DAILY
Qty: 90 TABLET | Refills: 0 | Status: SHIPPED | OUTPATIENT
Start: 2024-05-23 | End: 2024-08-21

## 2024-05-23 RX ORDER — ARIPIPRAZOLE 15 MG/1
15 TABLET ORAL DAILY
Qty: 90 TABLET | Refills: 0 | Status: SHIPPED | OUTPATIENT
Start: 2024-05-23 | End: 2024-08-21

## 2024-05-23 RX ORDER — BUSPIRONE HYDROCHLORIDE 5 MG/1
5 TABLET ORAL 2 TIMES DAILY
Qty: 180 TABLET | Refills: 0 | Status: SHIPPED | OUTPATIENT
Start: 2024-05-23 | End: 2024-08-21

## 2024-05-23 RX ORDER — PRAZOSIN HYDROCHLORIDE 1 MG/1
1 CAPSULE ORAL NIGHTLY
Qty: 90 CAPSULE | Refills: 0 | Status: SHIPPED | OUTPATIENT
Start: 2024-05-23 | End: 2024-08-21

## 2024-05-23 ASSESSMENT — ANXIETY QUESTIONNAIRES
2. NOT BEING ABLE TO STOP OR CONTROL WORRYING: SEVERAL DAYS
IF YOU CHECKED OFF ANY PROBLEMS ON THIS QUESTIONNAIRE, HOW DIFFICULT HAVE THESE PROBLEMS MADE IT FOR YOU TO DO YOUR WORK, TAKE CARE OF THINGS AT HOME, OR GET ALONG WITH OTHER PEOPLE: SOMEWHAT DIFFICULT
1. FEELING NERVOUS, ANXIOUS, OR ON EDGE: SEVERAL DAYS
GAD7 TOTAL SCORE: 8
7. FEELING AFRAID AS IF SOMETHING AWFUL MIGHT HAPPEN: SEVERAL DAYS
5. BEING SO RESTLESS THAT IT IS HARD TO SIT STILL: SEVERAL DAYS
3. WORRYING TOO MUCH ABOUT DIFFERENT THINGS: SEVERAL DAYS
4. TROUBLE RELAXING: SEVERAL DAYS
6. BECOMING EASILY ANNOYED OR IRRITABLE: MORE THAN HALF THE DAYS

## 2024-05-23 ASSESSMENT — ENCOUNTER SYMPTOMS
NERVOUS/ANXIOUS: 1
CONSTITUTIONAL NEGATIVE: 1

## 2024-05-23 NOTE — PROGRESS NOTES
"  Virtual or Telephone Consent    An interactive audio and video telecommunication system which permits real time communications between the patient (at the originating site) and provider (at the distant site) was utilized to provide this telehealth service.   Verbal consent was requested and obtained from Nelson Osborn on this date, 05/23/24 for a telehealth visit.       Assessment/Plan     Impression:  Nelson Osborn is a 24 y.o. female who presents via Telehealth for a follow up visit with CC of No chief complaint on file..     Patient consents to treatment.  Chief Complaint   Patient presents with    Bipolar    Anxiety    Depression    Med Management      Problem List Items Addressed This Visit             ICD-10-CM    Bipolar 1 disorder (Multi) - Primary F31.9    Relevant Medications    ARIPiprazole (Abilify) 15 mg tablet    sertraline (Zoloft) 50 mg tablet    DOMO (generalized anxiety disorder) F41.1    Relevant Medications    busPIRone (Buspar) 5 mg tablet    hydrOXYzine pamoate (Vistaril) 25 mg capsule    melatonin 10 mg tablet    sertraline (Zoloft) 50 mg tablet    PTSD (post-traumatic stress disorder) F43.10    Relevant Medications    prazosin (Minipress) 1 mg capsule       Patient is reminded that if there is SI to call [phone] and get themselves to the closest ED for evaluation, otherwise contact me for other questions/concerns.    Patient presenting to outpatient treatment for a scheduled psych follow up visit.      HPI   Background:     Patient was seen a couple of weeks ago for mood regulation. Reports she had a relapse on drugs using crack cocaine and alcohol and to go to rehab in [place]. Reports having a Manic episodes. Patient explains that he may have been due to the fact that she stopped her medications a one point and started using cocaine.  Patient reports Abilify has helped with helpful with managing her mood,  Patient hopes to continue staying sober and keep taking her medications as prescribed \" " "because they seem to be working. \"    Characteristics/Recent psychiatric symptoms (pertinent positives and negatives):    Patient reports she is still having symptoms of anxiety but they only happen when she takes her anxiety medications later at night but she takes them in the afternoon she is fine.  Patient ranks the severity of anxiety using the DOMO 7 scale with a rating of 8 for  anxiety symptoms. Patient denies depressive symptoms. Patient does explain that current medications have been successful in the management of anxiety and depressive symptoms.     NOTE: Symptom scale is rated where 0 = no symptoms at all, and 10 = symptoms so severe that pt is an imminent danger to themselves or others and requires hospitalization.    Anxiety and Mood dysregulation remain(s) present less days than not, which has improved over the past few weeks. Nelson Osborn rates the severity of psych symptoms as a 4/10 for mood regulation and a 4/10 for the anxiety, noting symptom improvement with taking her medications as prescribed and staying sober and worsening of symptoms with energy drinks.    Aggravating and/or relieving factors/triggers :    Aggravating factors of taking energy drinks.  Relieving factors of taking medications as prescribed   -SI/HI : Denies    Psychiatric Review Of Systems:  Depressive Symptoms: negative  Manic Symptoms: not needing much sleep \"  I was really hyper for a week, feel like on top of the world tan I crushed badlly.  Anxiety Symptoms: General Anxiety Disorder (DOMO)DOMO Behaviors: difficult to control worry, excessive anxiety/worry, and difficulty concentrating  Psychotic Symptoms: negative    Current Medications:    Current Outpatient Medications:     ARIPiprazole (Abilify) 15 mg tablet, Take 1 tablet (15 mg) by mouth once daily. Take half a dose ( 7.5 mg) in the morning, Disp: 90 tablet, Rfl: 0    busPIRone (Buspar) 5 mg tablet, Take 1 tablet (5 mg) by mouth 2 times a day. Take one pill in the " morning and one pill in the afternoon, Disp: 180 tablet, Rfl: 0    hydrOXYzine pamoate (Vistaril) 25 mg capsule, Take 1 capsule (25 mg) by mouth 3 times a day as needed for anxiety. Take 1 pill every 8 hours as needed for anxiety, Disp: 90 capsule, Rfl: 0    melatonin 10 mg tablet, Take 1 tablet (10 mg) by mouth once daily. Take 1 pill before bed, Disp: 90 tablet, Rfl: 0    naltrexone (Depade) 50 mg tablet, Take 1 tablet (50 mg) by mouth once daily at bedtime., Disp: , Rfl:     prazosin (Minipress) 1 mg capsule, Take 1 capsule (1 mg) by mouth once daily at bedtime. Take 1 pill at bed time, Disp: 90 capsule, Rfl: 0    sertraline (Zoloft) 50 mg tablet, Take 1 tablet (50 mg) by mouth once daily. Take 1 pill in the morning, Disp: 90 tablet, Rfl: 0     Medical History:  Past Medical History:   Diagnosis Date    Anxiety     Bipolar 1 disorder (Multi)     Depression      Surgical History:  Past Surgical History:   Procedure Laterality Date    APPENDECTOMY      HERNIA REPAIR      WISDOM TOOTH EXTRACTION       Family History:  Family History   Problem Relation Name Age of Onset    Hypertension Mother      Colon polyps Mother      Sleep apnea Father      Other (dm 2) Maternal Grandmother      Colon cancer Maternal Grandfather      Sleep apnea Paternal Grandmother       Social History:  Social History     Socioeconomic History    Marital status: Single     Spouse name: Not on file    Number of children: Not on file    Years of education: Not on file    Highest education level: Not on file   Occupational History    Not on file   Tobacco Use    Smoking status: Every Day     Passive exposure: Current    Smokeless tobacco: Never    Tobacco comments:     Vapes daily   Vaping Use    Vaping status: Every Day   Substance and Sexual Activity    Alcohol use: Not Currently    Drug use: Not Currently     Types: MDMA (ecstacy), Cocaine     Comment: history of coacaine, ecstacy and marijuana use    Sexual activity: Not on file   Other  "Topics Concern    Not on file   Social History Narrative    Not on file     Social Determinants of Health     Financial Resource Strain: Not on file   Food Insecurity: Not on file   Transportation Needs: Not on file   Physical Activity: Not on file   Stress: Not on file   Social Connections: Not on file   Intimate Partner Violence: Not on file   Housing Stability: Not on file     Vitals:  There were no vitals filed for this visit.  Allergies:  No Known Allergies    -PCP: Romina Cloud MD  -Pt reports currently is not pregnant, and currently is sexually active, does not use birth control. LMP: today  -TBI/head trauma/LOC/seizure hx: Denies    REVIEW OF SYSTEMS  Review of Systems   Constitutional: Negative.    Psychiatric/Behavioral:  The patient is nervous/anxious.      Objective   Appearance: Well groomed    Attitude: Calm, cooperative, and engaged in conversation.    Behavior: Appropriate eye contact.     Motor Activity: No psychomotor agitation or retardation. No abnormal movements, tremors or tics. No evidence of extrapyramidal symptoms or tardive dyskinesia.    Speech: Regular rate, rhythm, volume. Spontaneous, no pressured speech.    Mood:  '  I am pretty good. I am just a happy medium\"    Affect: Euthymic, full range, mood congruent.    Thought Process: Linear, logical, and goal-directed. No loose associations or gross thought disorganization.    Thought Content: Denied current suicidal ideation or thoughts of harm to self, denied homicidal ideation or thoughts of harm to others. No delusional thinking elicited. No perseverations or obsessions identified.     Perception: Did not endorse auditory or visual hallucinations, did not appear to be responding to hallucinatory stimuli.     Cognition: Alert, oriented x3. Preserved attention span and concentration, recent and remote memory. Adequate fund of knowledge. No deficits in language.     Insight: Fair, in regards to understanding mental health " condition    Judgement: Fair        Physical Exam      MEDICAL-DECISION MAKING    Patient educated and verbalized understanding on benefits, risks, and side effects of current medications. Outside referrals for psychotherapy sent to patient by the chemical dependency Counsellor.  Follow-up with psychiatry in 4 weeks for medication evaluation and effectiveness.        Psych med regimen as follows:   1. Continue current medications as prescribed    IMPRESSION  Diagnoses and all orders for this visit:  Bipolar 1 disorder (Multi) (Primary)  -     ARIPiprazole (Abilify) 15 mg tablet; Take 1 tablet (15 mg) by mouth once daily. Take half a dose ( 7.5 mg) in the morning  -     sertraline (Zoloft) 50 mg tablet; Take 1 tablet (50 mg) by mouth once daily. Take 1 pill in the morning  DOMO (generalized anxiety disorder)  -     busPIRone (Buspar) 5 mg tablet; Take 1 tablet (5 mg) by mouth 2 times a day. Take one pill in the morning and one pill in the afternoon  -     hydrOXYzine pamoate (Vistaril) 25 mg capsule; Take 1 capsule (25 mg) by mouth 3 times a day as needed for anxiety. Take 1 pill every 8 hours as needed for anxiety  -     melatonin 10 mg tablet; Take 1 tablet (10 mg) by mouth once daily. Take 1 pill before bed  -     sertraline (Zoloft) 50 mg tablet; Take 1 tablet (50 mg) by mouth once daily. Take 1 pill in the morning  PTSD (post-traumatic stress disorder)  -     prazosin (Minipress) 1 mg capsule; Take 1 capsule (1 mg) by mouth once daily at bedtime. Take 1 pill at bed time      SI/HI ASSESSMENT  -Risk Assessment: The pt is currently a low acute risk of suicide and self-harm due to no past suicide attempt(s) and not currently endorsing thoughts of suicide. The pt is currently a low acute risk of violence and harm to others due to no past history of violence and not currently threatening others.  -Suicidal Risk Factors:   -Violence Risk Factors: substance abuse  -Protective Factors: fear of suicide  -Plan to  Reduce Risk: Establish medication regimen and outpatient follow-up care  Denied current suicidal ideation or thoughts of harm to self, denied homicidal ideation or thoughts of harm to others. No delusional thinking elicited. No perseverations or obsessions identified.       PLAN  -Continue Medications as directed.  -Follow-up with this provider in 4 weeks.  -Risks/benefits/assessment of medication interventions discussed with pt; pt agreeable to plan. Will continue to monitor for symptoms mgmt and SEs and adjust plan as needed.  -MI to increase coping skills/behavior regulation.  -Safety plan reviewed.  -Call  Psychiatry at [phone] with issues.  -For [facility] residents, [employer] is a 24/7 hotline you can call for assistance at [phone] 143-9578. Please call 911 or go to your closest Emergency Room if you feel worse. This includes thoughts of hurting yourself or anyone else, or having other troubles such as hearing voices, seeing visions, or having new and scary thoughts about the people around you.    OARRS:  Fabby Nayak, APRN-CNP on 5/23/2024 11:16 AM  I have personally reviewed the OARRS report for Nelson Osborn. I have considered the risks of abuse, dependence, addiction and diversion  Medication is felt to be clinically appropriate based on documented diagnosis.    Recent Results (from the past 8760 hour(s))   Drug Screen, Urine    Collection Time: 05/04/24 12:29 AM   Result Value Ref Range    Amphetamine Screen, Urine Presumptive Negative Presumptive Negative    Barbiturate Screen, Urine Presumptive Negative Presumptive Negative    Benzodiazepines Screen, Urine Presumptive Negative Presumptive Negative    Cannabinoid Screen, Urine Presumptive Negative Presumptive Negative    Cocaine Metabolite Screen, Urine Presumptive Positive (A) Presumptive Negative    Fentanyl Screen, Urine Presumptive Negative Presumptive Negative    Opiate Screen, Urine Presumptive Negative Presumptive Negative     Oxycodone Screen, Urine Presumptive Negative Presumptive Negative    PCP Screen, Urine Presumptive Negative Presumptive Negative    Methadone Screen, Urine Presumptive Negative Presumptive Negative   Drug Screen, Urine With Reflex to Confirmation    Collection Time: 05/03/24 12:59 PM   Result Value Ref Range    Amphetamine Screen, Urine Presumptive Negative Presumptive Negative    Barbiturate Screen, Urine Presumptive Negative Presumptive Negative    Benzodiazepines Screen, Urine Presumptive Negative Presumptive Negative    Cannabinoid Screen, Urine Presumptive Negative Presumptive Negative    Cocaine Metabolite Screen, Urine Presumptive Negative Presumptive Negative    Fentanyl Screen, Urine Presumptive Negative Presumptive Negative    Opiate Screen, Urine Presumptive Negative Presumptive Negative    Oxycodone Screen, Urine Presumptive Negative Presumptive Negative    PCP Screen, Urine Presumptive Negative Presumptive Negative    Methadone Screen, Urine Presumptive Negative Presumptive Negative   Confirmation Opiate/Opioid/Benzo Prescription Compliance    Collection Time: 04/29/24 12:01 PM   Result Value Ref Range    Clonazepam <25 <25 ng/mL    7-Aminoclonazepam <25 <25 ng/mL    Alprazolam <25 <25 ng/mL    Alpha-Hydroxyalprazolam <25 <25 ng/mL    Midazolam <25 <25 ng/mL    Alpha-Hydroxymidazolam <25 <25 ng/mL    Chlordiazepoxide <25 <25 ng/mL    Diazepam <25 <25 ng/mL    Nordiazepam <25 <25 ng/mL    Temazepam <25 <25 ng/mL    Oxazepam <25 <25 ng/mL    Lorazepam <25 <25 ng/mL    Methadone <25 <25 ng/mL    EDDP <25 <25 ng/mL    6-Acetylmorphine <25 <25 ng/mL    Codeine <50 <50 ng/mL    Hydrocodone <25 <25 ng/mL    Hydromorphone <25 <25 ng/mL    Morphine  <50 <50 ng/mL    Norhydrocodone <25 <25 ng/mL    Noroxycodone <25 <25 ng/mL    Oxycodone <25 <25 ng/mL    Oxymorphone <25 <25 ng/mL    Fentanyl <2.5 <2.5 ng/mL    Norfentanyl <2.5 <2.5 ng/mL    Tramadol <50 <50 ng/mL    O-Desmethyltramadol <50 <50 ng/mL    Zolpidem  <25 <25 ng/mL    Zolpidem Metabolite (ZCA) <25 <25 ng/mL   Screen Opiate/Opioid/Benzo Prescription Compliance    Collection Time: 04/29/24 12:01 PM   Result Value Ref Range    Creatinine, Urine Random 43.1 20.0 - 320.0 mg/dL    Amphetamine Screen, Urine Presumptive Negative Presumptive Negative    Barbiturate Screen, Urine Presumptive Negative Presumptive Negative    Cannabinoid Screen, Urine Presumptive Negative Presumptive Negative    Cocaine Metabolite Screen, Urine Presumptive Positive (A) Presumptive Negative    PCP Screen, Urine Presumptive Negative Presumptive Negative       CHAPINCITO Curtis-CNP  Record Review: extensive    Follow up:   June 24th 11 am virtually    Time Spent:  Prep: 1 minutes  Direct time: 23 minutes  Documentation: 7 minutes  Total: 31 minutes

## 2024-05-24 NOTE — CARE PLAN
Problem: D5 Substance free life: Lifestyle which reinforces maintenance of chemical dependency  Goal: STG Patient will discuss key principals of addictive disease and relate them to their personal experience by end of first week of treatment program  Outcome: Progressing  Goal: STG Patient will identify three personal reinforcers of chemical dependency by end of second week of treatment program  Outcome: Progressing  Goal: LTG Patient will establish a structured recovery peer support program during course of treatment program  Outcome: Progressing  Goal: LTG Patient will develop a written plan for continuing treatment post discharge during last scheduled week of treatment  Outcome: Progressing

## 2024-06-18 ENCOUNTER — TELEPHONE (OUTPATIENT)
Dept: OBSTETRICS AND GYNECOLOGY | Facility: CLINIC | Age: 24
End: 2024-06-18

## 2024-06-18 ENCOUNTER — APPOINTMENT (OUTPATIENT)
Dept: OBSTETRICS AND GYNECOLOGY | Facility: CLINIC | Age: 24
End: 2024-06-18
Payer: OTHER GOVERNMENT

## 2024-06-18 VITALS
WEIGHT: 127 LBS | DIASTOLIC BLOOD PRESSURE: 56 MMHG | BODY MASS INDEX: 21.16 KG/M2 | HEIGHT: 65 IN | SYSTOLIC BLOOD PRESSURE: 89 MMHG

## 2024-06-18 DIAGNOSIS — Z30.09 BIRTH CONTROL COUNSELING: ICD-10-CM

## 2024-06-18 DIAGNOSIS — Z20.2 POSSIBLE EXPOSURE TO STD: ICD-10-CM

## 2024-06-18 DIAGNOSIS — N92.6 MISSED PERIOD: Primary | ICD-10-CM

## 2024-06-18 DIAGNOSIS — Z01.419 WELL WOMAN EXAM WITH ROUTINE GYNECOLOGICAL EXAM: ICD-10-CM

## 2024-06-18 PROBLEM — R45.1 RESTLESSNESS AND AGITATION: Status: ACTIVE | Noted: 2024-06-18

## 2024-06-18 PROBLEM — Z48.02 ENCOUNTER FOR REMOVAL OF SUTURES: Status: RESOLVED | Noted: 2023-06-21 | Resolved: 2024-06-18

## 2024-06-18 LAB — PREGNANCY TEST URINE, POC: POSITIVE

## 2024-06-18 PROCEDURE — 81025 URINE PREGNANCY TEST: CPT | Performed by: ADVANCED PRACTICE MIDWIFE

## 2024-06-18 PROCEDURE — 99385 PREV VISIT NEW AGE 18-39: CPT | Performed by: ADVANCED PRACTICE MIDWIFE

## 2024-06-18 PROCEDURE — 87491 CHLMYD TRACH DNA AMP PROBE: CPT

## 2024-06-18 PROCEDURE — 88175 CYTOPATH C/V AUTO FLUID REDO: CPT

## 2024-06-18 PROCEDURE — 87661 TRICHOMONAS VAGINALIS AMPLIF: CPT

## 2024-06-18 PROCEDURE — 87591 N.GONORRHOEAE DNA AMP PROB: CPT

## 2024-06-18 PROCEDURE — 99203 OFFICE O/P NEW LOW 30 MIN: CPT | Performed by: ADVANCED PRACTICE MIDWIFE

## 2024-06-18 RX ORDER — ROPINIROLE 0.5 MG/1
TABLET, FILM COATED ORAL
COMMUNITY
Start: 2024-05-07

## 2024-06-18 ASSESSMENT — LIFESTYLE VARIABLES
HOW MANY STANDARD DRINKS CONTAINING ALCOHOL DO YOU HAVE ON A TYPICAL DAY: PATIENT DOES NOT DRINK
HOW OFTEN DO YOU HAVE A DRINK CONTAINING ALCOHOL: NEVER
SKIP TO QUESTIONS 9-10: 1
HOW OFTEN DO YOU HAVE SIX OR MORE DRINKS ON ONE OCCASION: NEVER
AUDIT-C TOTAL SCORE: 0

## 2024-06-18 ASSESSMENT — ENCOUNTER SYMPTOMS
OCCASIONAL FEELINGS OF UNSTEADINESS: 0
LOSS OF SENSATION IN FEET: 0
DEPRESSION: 0

## 2024-06-18 ASSESSMENT — PATIENT HEALTH QUESTIONNAIRE - PHQ9
1. LITTLE INTEREST OR PLEASURE IN DOING THINGS: NOT AT ALL
2. FEELING DOWN, DEPRESSED OR HOPELESS: NOT AT ALL
SUM OF ALL RESPONSES TO PHQ9 QUESTIONS 1 AND 2: 0

## 2024-06-18 NOTE — PROGRESS NOTES
"Valeria Osborn is a 24 y.o. female who is here for a routine well woman exam.     Concerns today:  1st pap & STI testing  Discuss birth control    No LMP recorded (lmp unknown).   Periods are regular every 28-30 days, lasting 5 days. Last period was only one day and very light  Dysmenorrhea:moderate, occurring premenstrually and first 1-2 days of flow.   Cyclic symptoms include moodiness and pelvic pain.     Sexual Activity: sexually active, male partners; Patient reports 6 partners in the last 12 months.  Pain with intercourse? No   Loss of desire? No   Able to have an orgasm? Yes     History of prior STI: none    Current contraception: none    Last pap: never  History of abnormal Pap smear: no  Treatment for cervical dysplasia: no  Received HPV vaccine series?: yes    Family history of breast cancer or ovarian cancer: no    Menstrual History:  OB History          0    Para   0    Term   0       0    AB   0    Living   0         SAB   0    IAB   0    Ectopic   0    Multiple   0    Live Births   0                Menarche age: 15  No LMP recorded (lmp unknown).         Objective   BP 89/56   Ht 1.651 m (5' 5\")   Wt 57.6 kg (127 lb)   LMP  (LMP Unknown)   BMI 21.13 kg/m²     Physical Exam  Constitutional:       Appearance: Normal appearance.   HENT:      Head: Normocephalic.      Nose: Nose normal.      Mouth/Throat:      Mouth: Mucous membranes are moist.      Pharynx: Oropharynx is clear.   Eyes:      Pupils: Pupils are equal, round, and reactive to light.   Cardiovascular:      Rate and Rhythm: Normal rate and regular rhythm.   Pulmonary:      Effort: Pulmonary effort is normal.      Breath sounds: Normal breath sounds.   Chest:   Breasts:     Right: Normal. No mass, nipple discharge, skin change or tenderness.      Left: Normal. No mass, nipple discharge, skin change or tenderness.   Abdominal:      General: Abdomen is flat.      Palpations: Abdomen is soft.   Genitourinary:     " General: Normal vulva.      Vagina: Normal.      Cervix: Normal.   Musculoskeletal:         General: Normal range of motion.      Cervical back: Normal range of motion and neck supple.   Skin:     General: Skin is warm and dry.   Neurological:      Mental Status: She is alert.   Psychiatric:         Mood and Affect: Mood normal.         Behavior: Behavior normal.          Assessment/Plan   Normal well woman exam  Continue healthy lifestyle habits  Consider taking a multivitamin daily  Continue recommended women's health screenings  Pap collected, if normal, repeat in 3 yrs  Potential exposure to STIs  GC/CT, trich collected off pap  Declines  serum collection for HIV, Hep B, Hep C, & Syphilis  Possible pregnancy  Unprotected sex & LMP was only 1 day  UPT today  Birth control counseling  Discussed risks for blood clots using estrogen and vaping nicotine.  Discussed progestin only methods and non-hormonal methods  Prefers pills, does not want any LARCs  Referred to [email or web address]  Virtual visit for birth control initiation      Return to care for annual exam or sooner as needed.    Mai Bradley, CHAPINCITO-KENTON

## 2024-06-21 LAB
C TRACH RRNA SPEC QL NAA+PROBE: NEGATIVE
N GONORRHOEA DNA SPEC QL PROBE+SIG AMP: NEGATIVE
T VAGINALIS RRNA SPEC QL NAA+PROBE: NEGATIVE

## 2024-06-23 LAB
CYTOLOGY CMNT CVX/VAG CYTO-IMP: NORMAL
LAB AP HPV HR: NORMAL
LAB AP PAP ADDITIONAL TESTS: NORMAL
LABORATORY COMMENT REPORT: NORMAL
PATH REPORT.TOTAL CANCER: NORMAL

## 2024-06-24 ENCOUNTER — APPOINTMENT (OUTPATIENT)
Dept: OBSTETRICS AND GYNECOLOGY | Facility: CLINIC | Age: 24
End: 2024-06-24
Payer: OTHER GOVERNMENT

## 2024-06-24 ENCOUNTER — APPOINTMENT (OUTPATIENT)
Dept: BEHAVIORAL HEALTH | Facility: CLINIC | Age: 24
End: 2024-06-24
Payer: OTHER GOVERNMENT

## 2024-06-24 VITALS — DIASTOLIC BLOOD PRESSURE: 60 MMHG | WEIGHT: 128 LBS | BODY MASS INDEX: 21.3 KG/M2 | SYSTOLIC BLOOD PRESSURE: 108 MMHG

## 2024-06-24 DIAGNOSIS — O36.80X1 PREGNANCY WITH INCONCLUSIVE FETAL VIABILITY, FETUS 1 OF MULTIPLE GESTATION (HHS-HCC): Primary | ICD-10-CM

## 2024-06-24 DIAGNOSIS — F31.9 BIPOLAR 1 DISORDER (MULTI): ICD-10-CM

## 2024-06-24 DIAGNOSIS — F41.1 GAD (GENERALIZED ANXIETY DISORDER): ICD-10-CM

## 2024-06-24 PROCEDURE — 99214 OFFICE O/P EST MOD 30 MIN: CPT

## 2024-06-24 PROCEDURE — 0500F INITIAL PRENATAL CARE VISIT: CPT | Performed by: OBSTETRICS & GYNECOLOGY

## 2024-06-24 ASSESSMENT — EDINBURGH POSTNATAL DEPRESSION SCALE (EPDS)
THE THOUGHT OF HARMING MYSELF HAS OCCURRED TO ME: NEVER
I HAVE BEEN SO UNHAPPY THAT I HAVE HAD DIFFICULTY SLEEPING: YES, SOMETIMES
THINGS HAVE BEEN GETTING ON TOP OF ME: YES, SOMETIMES I HAVEN'T BEEN COPING AS WELL AS USUAL
TOTAL SCORE: 14
I HAVE BEEN ABLE TO LAUGH AND SEE THE FUNNY SIDE OF THINGS: AS MUCH AS I ALWAYS COULD
I HAVE BLAMED MYSELF UNNECESSARILY WHEN THINGS WENT WRONG: YES, MOST OF THE TIME
I HAVE BEEN ANXIOUS OR WORRIED FOR NO GOOD REASON: YES, SOMETIMES
I HAVE FELT SAD OR MISERABLE: NOT VERY OFTEN
I HAVE FELT SCARED OR PANICKY FOR NO GOOD REASON: YES, SOMETIMES
I HAVE LOOKED FORWARD WITH ENJOYMENT TO THINGS: RATHER LESS THAN I USED TO
I HAVE BEEN SO UNHAPPY THAT I HAVE BEEN CRYING: ONLY OCCASIONALLY

## 2024-06-24 ASSESSMENT — PATIENT HEALTH QUESTIONNAIRE - PHQ9
2. FEELING DOWN, DEPRESSED OR HOPELESS: NOT AT ALL
SUM OF ALL RESPONSES TO PHQ9 QUESTIONS 1 & 2: 0
1. LITTLE INTEREST OR PLEASURE IN DOING THINGS: NOT AT ALL

## 2024-06-24 ASSESSMENT — ANXIETY QUESTIONNAIRES
2. NOT BEING ABLE TO STOP OR CONTROL WORRYING: NOT AT ALL
7. FEELING AFRAID AS IF SOMETHING AWFUL MIGHT HAPPEN: NOT AT ALL
6. BECOMING EASILY ANNOYED OR IRRITABLE: NOT AT ALL
3. WORRYING TOO MUCH ABOUT DIFFERENT THINGS: NOT AT ALL
GAD7 TOTAL SCORE: 0
IF YOU CHECKED OFF ANY PROBLEMS ON THIS QUESTIONNAIRE, HOW DIFFICULT HAVE THESE PROBLEMS MADE IT FOR YOU TO DO YOUR WORK, TAKE CARE OF THINGS AT HOME, OR GET ALONG WITH OTHER PEOPLE: NOT DIFFICULT AT ALL
5. BEING SO RESTLESS THAT IT IS HARD TO SIT STILL: NOT AT ALL
4. TROUBLE RELAXING: NOT AT ALL
1. FEELING NERVOUS, ANXIOUS, OR ON EDGE: NOT AT ALL

## 2024-06-24 ASSESSMENT — ENCOUNTER SYMPTOMS
CONSTITUTIONAL NEGATIVE: 1
PSYCHIATRIC NEGATIVE: 1

## 2024-06-24 NOTE — PROGRESS NOTES
Subjective   Patient ID: Nelson Osborn is a 24 y.o. female who presents for Initial Prenatal Visit.  HPI  Unsure LMP .   Office scan shows sac  IUP approx 5 weeks size w no FHR.  Pregnancy unplanned. Working on substance use disorder. Sober x 2 months fr alcohol and cocaine.    Review of Systems  Saw counselor last week on meds for mental health. Doing well. Stopped smoking.     Objective   Physical Exam  Physical Exam         Appearance: Normal appearance. Affect normal and alert  Pulmonary:      Effort: Pulmonary effort is normal. Breath sounds clear  Skin: no rashes or lesions     Abdominal:     Abdomen is flat, soft, nontender. No distension. No mass palpated.      Genitourinary:   Exam deferred        Extremities:  Nontender, no edema. Normal range of motion    Assessment/Plan   Diagnoses and all orders for this visit:  Pregnancy with inconclusive fetal viability, fetus 1 of multiple gestation ([facility])  -     US MAC OB imaging order;  order for next week to check FHR.  Will do all OB labs at next visit . Pt unsure if she will continue this pregnancy .        Preeti Guzman MD 06/24/24 2:18 PM

## 2024-06-24 NOTE — PROGRESS NOTES
"  Virtual or Telephone Consent    An interactive audio and video telecommunication system which permits real time communications between the patient (at the originating site) and provider (at the distant site) was utilized to provide this telehealth service.   Verbal consent was requested and obtained from Nelson Osborn on this date, 06/24/24 for a telehealth visit.       Assessment/Plan     Impression:  Nelson Osborn is a 24 y.o. female who presents via Telehealth for a follow up visit with CC of  \" I am pretty good.\"     Patient consents to treatment.  Chief Complaint   Patient presents with    Anxiety    Bipolar      Problem List Items Addressed This Visit             ICD-10-CM    Bipolar 1 disorder (Multi) F31.9    DOMO (generalized anxiety disorder) F41.1       Patient is reminded that if there is SI to call [phone] and get themselves to the closest ED for evaluation, otherwise contact me for other questions/concerns.    Patient presenting to outpatient treatment for a scheduled psych follow up visit.      HPI   Background:     Patient was seen a couple of weeks ago for management of manic episodes.  Patient dose of Abilify was  increased  a couple of visits ago to help with manic episodes. Patient reports no longer having manic episodes or  anxiety since she quits smoking.  Patient reports the medication has helped with managing symptoms  Patient hopes to keep feeling better. Patient reports she is currently pregnant, but suggests she will terminate the pregnancy, and had a discussion with boyfriend about it.     Characteristics/Recent psychiatric symptoms (pertinent positives and negatives):    Patient denies anxiety, denies depressive symptoms.   Patient ranks the severity of anxiety using the DOMO 7 scale with a rating of 0 for anxiety symptoms. Patient ranks the severity of depression using the PHQ 9 scale with a rating of 0 for  depressive symptoms. Patient does explain that current dose of Zoloft has been " successful in the management of anxiety and depressive symptoms. Reports Abilify has helped with manic episodes.    NOTE: Symptom scale is rated where 0 = no symptoms at all, and 10 = symptoms so severe that pt is an imminent danger to themselves or others and requires hospitalization.    Anxiety and Depression remain(s) present less days than not, which has improved over the past few weeks. Nelson Osborn rates the severity of psych symptoms as a 2/10, noting symptom improvement with taking medications and worsening of symptoms with not taking medications.    -SI/HI : Denies    Psychiatric Review Of Systems:  Depressive Symptoms: negative  Manic Symptoms: negative  Anxiety Symptoms: Negative  Psychotic Symptoms: negative      Current Medications:    Current Outpatient Medications:     ARIPiprazole (Abilify) 15 mg tablet, Take 1 tablet (15 mg) by mouth once daily. Take half a dose ( 7.5 mg) in the morning, Disp: 90 tablet, Rfl: 0    busPIRone (Buspar) 5 mg tablet, Take 1 tablet (5 mg) by mouth 2 times a day. Take one pill in the morning and one pill in the afternoon, Disp: 180 tablet, Rfl: 0    hydrOXYzine pamoate (Vistaril) 25 mg capsule, Take 1 capsule (25 mg) by mouth 3 times a day as needed for anxiety. Take 1 pill every 8 hours as needed for anxiety, Disp: 90 capsule, Rfl: 0    melatonin 10 mg tablet, Take 1 tablet (10 mg) by mouth once daily. Take 1 pill before bed, Disp: 90 tablet, Rfl: 0    prazosin (Minipress) 1 mg capsule, Take 1 capsule (1 mg) by mouth once daily at bedtime. Take 1 pill at bed time, Disp: 90 capsule, Rfl: 0    rOPINIRole (Requip) 0.5 mg tablet, , Disp: , Rfl:     sertraline (Zoloft) 50 mg tablet, Take 1 tablet (50 mg) by mouth once daily. Take 1 pill in the morning, Disp: 90 tablet, Rfl: 0     Medical History:  Past Medical History:   Diagnosis Date    Anxiety     Bipolar 1 disorder (Multi)     Depression      Surgical History:  Past Surgical History:   Procedure Laterality Date     APPENDECTOMY      HERNIA REPAIR      WISDOM TOOTH EXTRACTION       Family History:  Family History   Problem Relation Name Age of Onset    Hypertension Mother      Colon polyps Mother      Sleep apnea Father      Other (dm 2) Maternal Grandmother      Colon cancer Maternal Grandfather      Sleep apnea Paternal Grandmother       Social History:  Social History     Socioeconomic History    Marital status: Single     Spouse name: Not on file    Number of children: Not on file    Years of education: Not on file    Highest education level: Not on file   Occupational History    Not on file   Tobacco Use    Smoking status: Every Day     Types: Cigarettes     Passive exposure: Current    Smokeless tobacco: Never    Tobacco comments:     Vapes daily   Vaping Use    Vaping status: Every Day    Substances: Nicotine   Substance and Sexual Activity    Alcohol use: Not Currently    Drug use: Not Currently     Types: MDMA (ecstacy), Cocaine, Marijuana     Comment: history of coacaine, ecstacy and marijuana use    Sexual activity: Yes     Partners: Male     Birth control/protection: None   Other Topics Concern    Not on file   Social History Narrative    Not on file     Social Determinants of Health     Financial Resource Strain: Not on file   Food Insecurity: Not on file   Transportation Needs: Not on file   Physical Activity: Not on file   Stress: Not on file   Social Connections: Not on file   Intimate Partner Violence: Not on file   Housing Stability: Not on file     Vitals:  There were no vitals filed for this visit.  Allergies:  No Known Allergies    -PCP: Romina Cloud MD  -Pt reports currently is pregnant  -TBI/head trauma/LOC/seizure hx: Denies    REVIEW OF SYSTEMS  Review of Systems   Constitutional: Negative.    Psychiatric/Behavioral: Negative.       Objective   Appearance: Well groomed    Attitude: Calm, cooperative, and engaged in conversation.    Behavior: Appropriate eye contact.     Motor Activity: No  "psychomotor agitation or retardation. No abnormal movements, tremors or tics. No evidence of extrapyramidal symptoms or tardive dyskinesia.    Speech: Regular rate, rhythm, volume. Spontaneous, no pressured speech.    Mood:  \" I am pretty stable, in a pretty good mood right now \"    Affect:  full range, mood congruent.    Thought Process: Linear, logical, and goal-directed. No loose associations or gross thought disorganization.    Thought Content: Denied current suicidal ideation or thoughts of harm to self, denied homicidal ideation or thoughts of harm to others. No delusional thinking elicited. No perseverations or obsessions identified.     Perception: Did not endorse auditory or visual hallucinations, did not appear to be responding to hallucinatory stimuli.     Cognition: Alert, oriented x3. Preserved attention span and concentration, recent and remote memory. Adequate fund of knowledge. No deficits in language.     Insight: Fair, in regards to understanding mental health condition    Judgement: Fair        Physical Exam      MEDICAL-DECISION MAKING    Patient educated and verbalized understanding on benefits, risks, and side effects of current medications.  Start psychotherapy with . Follow-up with psychiatry in 2 weeks for medication evaluation and effectiveness.        Psych med regimen as follows:   1. Stop taking Hydroxyzine while you are pregnant    IMPRESSION  There are no diagnoses linked to this encounter.    SI/HI ASSESSMENT  -Risk Assessment: The pt is currently a low acute risk of suicide and self-harm due to no past suicide attempt(s) and not currently endorsing thoughts of suicide. The pt is currently a low acute risk of violence and harm to others due to no past history of violence and not currently threatening others.  -Suicidal Risk Factors:  and current psychiatric illness  -Violence Risk Factors: Current psychiatric illness  -Protective Factors: fear of suicide  -Plan to Reduce " Risk: Establish medication regimen and outpatient follow-up care  Denied current suicidal ideation or thoughts of harm to self, denied homicidal ideation or thoughts of harm to others. No delusional thinking elicited. No perseverations or obsessions identified.       PLAN  -Continue Medications as directed.  -Follow-up with this provider in 2 weeks.  -Risks/benefits/assessment of medication interventions discussed with pt; pt agreeable to plan. Will continue to monitor for symptoms mgmt and SEs and adjust plan as needed.  -MI to increase coping skills/behavior regulation.  -Safety plan reviewed.  -Call  Psychiatry at [phone] with issues.  -For [facility] residents, [facility] is a 24/7 hotline you can call for assistance at [phone]. Please call 911 or go to your closest Emergency Room if you feel worse. This includes thoughts of hurting yourself or anyone else, or having other troubles such as hearing voices, seeing visions, or having new and scary thoughts about the people around you.    OARRS:  No data recorded  I have personally reviewed the OARRS report for Nelson Osborn. I have considered the risks of abuse, dependence, addiction and diversion  Medication is felt to be clinically appropriate based on documented diagnosis.    Recent Results (from the past 8760 hour(s))   Drug Screen, Urine    Collection Time: 05/04/24 12:29 AM   Result Value Ref Range    Amphetamine Screen, Urine Presumptive Negative Presumptive Negative    Barbiturate Screen, Urine Presumptive Negative Presumptive Negative    Benzodiazepines Screen, Urine Presumptive Negative Presumptive Negative    Cannabinoid Screen, Urine Presumptive Negative Presumptive Negative    Cocaine Metabolite Screen, Urine Presumptive Positive (A) Presumptive Negative    Fentanyl Screen, Urine Presumptive Negative Presumptive Negative    Opiate Screen, Urine Presumptive Negative Presumptive Negative    Oxycodone Screen, Urine Presumptive  Negative Presumptive Negative    PCP Screen, Urine Presumptive Negative Presumptive Negative    Methadone Screen, Urine Presumptive Negative Presumptive Negative   Drug Screen, Urine With Reflex to Confirmation    Collection Time: 05/03/24 12:59 PM   Result Value Ref Range    Amphetamine Screen, Urine Presumptive Negative Presumptive Negative    Barbiturate Screen, Urine Presumptive Negative Presumptive Negative    Benzodiazepines Screen, Urine Presumptive Negative Presumptive Negative    Cannabinoid Screen, Urine Presumptive Negative Presumptive Negative    Cocaine Metabolite Screen, Urine Presumptive Negative Presumptive Negative    Fentanyl Screen, Urine Presumptive Negative Presumptive Negative    Opiate Screen, Urine Presumptive Negative Presumptive Negative    Oxycodone Screen, Urine Presumptive Negative Presumptive Negative    PCP Screen, Urine Presumptive Negative Presumptive Negative    Methadone Screen, Urine Presumptive Negative Presumptive Negative   Confirmation Opiate/Opioid/Benzo Prescription Compliance    Collection Time: 04/29/24 12:01 PM   Result Value Ref Range    Clonazepam <25 <25 ng/mL    7-Aminoclonazepam <25 <25 ng/mL    Alprazolam <25 <25 ng/mL    Alpha-Hydroxyalprazolam <25 <25 ng/mL    Midazolam <25 <25 ng/mL    Alpha-Hydroxymidazolam <25 <25 ng/mL    Chlordiazepoxide <25 <25 ng/mL    Diazepam <25 <25 ng/mL    Nordiazepam <25 <25 ng/mL    Temazepam <25 <25 ng/mL    Oxazepam <25 <25 ng/mL    Lorazepam <25 <25 ng/mL    Methadone <25 <25 ng/mL    EDDP <25 <25 ng/mL    6-Acetylmorphine <25 <25 ng/mL    Codeine <50 <50 ng/mL    Hydrocodone <25 <25 ng/mL    Hydromorphone <25 <25 ng/mL    Morphine  <50 <50 ng/mL    Norhydrocodone <25 <25 ng/mL    Noroxycodone <25 <25 ng/mL    Oxycodone <25 <25 ng/mL    Oxymorphone <25 <25 ng/mL    Fentanyl <2.5 <2.5 ng/mL    Norfentanyl <2.5 <2.5 ng/mL    Tramadol <50 <50 ng/mL    O-Desmethyltramadol <50 <50 ng/mL    Zolpidem <25 <25 ng/mL    Zolpidem Metabolite  (ZCA) <25 <25 ng/mL   Screen Opiate/Opioid/Benzo Prescription Compliance    Collection Time: 04/29/24 12:01 PM   Result Value Ref Range    Creatinine, Urine Random 43.1 20.0 - 320.0 mg/dL    Amphetamine Screen, Urine Presumptive Negative Presumptive Negative    Barbiturate Screen, Urine Presumptive Negative Presumptive Negative    Cannabinoid Screen, Urine Presumptive Negative Presumptive Negative    Cocaine Metabolite Screen, Urine Presumptive Positive (A) Presumptive Negative    PCP Screen, Urine Presumptive Negative Presumptive Negative         CHAPINCITO Curtis-CNP  Record Review: extensive    Follow up:   July 11th at 1 pm virtually    Time Spent:  Prep: 0 minute  Direct time: 20 minutes  Documentation: 5 minutes  Total: 25 minutes

## 2024-07-02 ENCOUNTER — HOSPITAL ENCOUNTER (OUTPATIENT)
Dept: RADIOLOGY | Facility: HOSPITAL | Age: 24
Discharge: HOME | End: 2024-07-02
Payer: OTHER GOVERNMENT

## 2024-07-02 DIAGNOSIS — O36.80X1 PREGNANCY WITH INCONCLUSIVE FETAL VIABILITY, FETUS 1 OF MULTIPLE GESTATION (HHS-HCC): ICD-10-CM

## 2024-07-02 PROCEDURE — 76817 TRANSVAGINAL US OBSTETRIC: CPT | Performed by: OBSTETRICS & GYNECOLOGY

## 2024-07-02 PROCEDURE — 76817 TRANSVAGINAL US OBSTETRIC: CPT

## 2024-07-02 PROCEDURE — 76801 OB US < 14 WKS SINGLE FETUS: CPT | Performed by: OBSTETRICS & GYNECOLOGY

## 2024-07-02 PROCEDURE — 76801 OB US < 14 WKS SINGLE FETUS: CPT

## 2024-07-02 NOTE — TELEPHONE ENCOUNTER
Patient called OB line today stating she visited  for an ultrasound for viability following her office visit with Dr Guzman on . Patient states  told her she was roughly 7 weeks GA with no FHT's and the placenta was detaching. Patient orginally called to schedule a D&C.    Discussed this with Dr Guzman, she advised to get a formal scan today or tomorrow and to schedule with MD for Friday to review results and discuss options. Patient agrees to plan. Ultrasound scheduled for today at [facility] at 2:30pm, LVM making patient aware of time and location, instructed to call back with questions or if this does not work. Also scheduled with Dr Rush  at 10:50am.

## 2024-07-03 NOTE — PROGRESS NOTES
"Patient presents for follow up to [facility] on 24 via MFM ultrasound.  Wants to discuss options.  Rh status: Unknown, draw T&S today - pt believes she is Rh negative.   C/O: Questions     Starr Daniels MA II    GYN Follow up visit - missed       HPI: Pt presents for follow up visit  to discuss management of missed . She had an MFM USN done on  which confirmed a missed  with CRL measuring 8.1 without cardiac activity noted.      She has not had any vaginal bleeding, but has had some mild cramping.     O:  /67 (BP Location: Right arm, Patient Position: Sitting)   Pulse 77   Ht 1.651 m (5' 5\")   Wt 58.7 kg (129 lb 6.4 oz)   LMP 04/15/2024 (Approximate)   SpO2 98%   Breastfeeding Unknown   BMI 21.53 kg/m²      Physical exam:   General Appearance   - consistent with stated age, well groomed and cooperative    Integumentary  - skin warm and dry without rash    Head and Neck  - normalocephalic and neck supple    Chest and Lung Exam  - normal breathing effort, no respiratory distress    Peripheral Vascular  - no edema present    A/P: 24 y.o. female with a missed , approx 8 weeks by CRL.     - Discussed management options for missed  including expectant management, medical management with vaginal misoprostol, and surgical management (including option for office-based procedure with MVA or hospital-based procedure with anesthesia).   - Pros/cons of each discussed.  Ultimately, pt would like to proceed with Hospital-based D&C procedure. Will work on scheduling.  - Recommended f/u in 2 weeks after procedure  - Pt is Rh unknown . CBC and Type and screen sent from office today.     30 minutes were spent on this patient encounter today. 5 minutes was spent reviewing the patient's chart and history.  20 minutes was spent face to face obtaining a history, making a plan, providing counseling, and answering questions.  5 minutes was spent charting after the face to face encounter " with the patient.

## 2024-07-05 ENCOUNTER — PREP FOR PROCEDURE (OUTPATIENT)
Dept: OBSTETRICS AND GYNECOLOGY | Facility: HOSPITAL | Age: 24
End: 2024-07-05

## 2024-07-05 ENCOUNTER — OFFICE VISIT (OUTPATIENT)
Dept: OBSTETRICS AND GYNECOLOGY | Facility: CLINIC | Age: 24
End: 2024-07-05
Payer: OTHER GOVERNMENT

## 2024-07-05 VITALS
BODY MASS INDEX: 21.56 KG/M2 | OXYGEN SATURATION: 98 % | HEIGHT: 65 IN | SYSTOLIC BLOOD PRESSURE: 102 MMHG | WEIGHT: 129.4 LBS | DIASTOLIC BLOOD PRESSURE: 67 MMHG | HEART RATE: 77 BPM

## 2024-07-05 DIAGNOSIS — O02.1 MISSED ABORTION (HHS-HCC): Primary | ICD-10-CM

## 2024-07-05 DIAGNOSIS — O20.0 THREATENED MISCARRIAGE (HHS-HCC): ICD-10-CM

## 2024-07-05 LAB
ABO GROUP (TYPE) IN BLOOD: NORMAL
ANTIBODY SCREEN: NORMAL
ERYTHROCYTE [DISTWIDTH] IN BLOOD BY AUTOMATED COUNT: 13.1 % (ref 11.5–14.5)
HCT VFR BLD AUTO: 38.2 % (ref 36–46)
HGB BLD-MCNC: 12.6 G/DL (ref 12–16)
MCH RBC QN AUTO: 30.2 PG (ref 26–34)
MCHC RBC AUTO-ENTMCNC: 33 G/DL (ref 32–36)
MCV RBC AUTO: 92 FL (ref 80–100)
NRBC BLD-RTO: 0 /100 WBCS (ref 0–0)
PLATELET # BLD AUTO: 292 X10*3/UL (ref 150–450)
RBC # BLD AUTO: 4.17 X10*6/UL (ref 4–5.2)
RH FACTOR (ANTIGEN D): NORMAL
WBC # BLD AUTO: 7.6 X10*3/UL (ref 4.4–11.3)

## 2024-07-05 PROCEDURE — 86901 BLOOD TYPING SEROLOGIC RH(D): CPT

## 2024-07-05 PROCEDURE — 85027 COMPLETE CBC AUTOMATED: CPT

## 2024-07-05 PROCEDURE — 86850 RBC ANTIBODY SCREEN: CPT

## 2024-07-05 PROCEDURE — 86900 BLOOD TYPING SEROLOGIC ABO: CPT

## 2024-07-05 PROCEDURE — 36415 COLL VENOUS BLD VENIPUNCTURE: CPT

## 2024-07-05 RX ORDER — ACETAMINOPHEN 325 MG/1
975 TABLET ORAL ONCE
OUTPATIENT
Start: 2024-07-05 | End: 2024-07-05

## 2024-07-05 RX ORDER — GABAPENTIN 600 MG/1
600 TABLET ORAL ONCE
OUTPATIENT
Start: 2024-07-05 | End: 2024-07-05

## 2024-07-05 RX ORDER — CELECOXIB 200 MG/1
400 CAPSULE ORAL ONCE
OUTPATIENT
Start: 2024-07-05 | End: 2024-07-05

## 2024-07-05 ASSESSMENT — PAIN SCALES - GENERAL: PAINLEVEL: 3

## 2024-07-06 LAB — REFLEX ADDED, ANEMIA PANEL: NORMAL

## 2024-07-09 ENCOUNTER — HOSPITAL ENCOUNTER (OUTPATIENT)
Dept: RADIOLOGY | Facility: CLINIC | Age: 24
Discharge: HOME | End: 2024-07-09
Payer: OTHER GOVERNMENT

## 2024-07-09 ENCOUNTER — TELEPHONE (OUTPATIENT)
Dept: OBSTETRICS AND GYNECOLOGY | Facility: CLINIC | Age: 24
End: 2024-07-09
Payer: OTHER GOVERNMENT

## 2024-07-09 DIAGNOSIS — O36.80X1 PREGNANCY WITH INCONCLUSIVE FETAL VIABILITY, FETUS 1 OF MULTIPLE GESTATION (HHS-HCC): ICD-10-CM

## 2024-07-09 PROCEDURE — 76815 OB US LIMITED FETUS(S): CPT

## 2024-07-09 PROCEDURE — 76815 OB US LIMITED FETUS(S): CPT | Performed by: STUDENT IN AN ORGANIZED HEALTH CARE EDUCATION/TRAINING PROGRAM

## 2024-07-09 NOTE — TELEPHONE ENCOUNTER
Patient left voicemail on OB line that she will take the appointment for 3:30pm to have an ultrasound performed.

## 2024-07-09 NOTE — TELEPHONE ENCOUNTER
Per secure message from Dr Rogers:    Patient probably had miscarriage at home tonight.  She paged the answering service at 1819 and Dr Burns called her back.  She was lightheaded and dizzy and in a lot of pain.  She was told to call 911 and come to the ER.    At 2040 there was no sign of her in the ER.  I called her back.  She states that she passed a bunch of tissue and the pain got better and the bleeding slowed down.  She is not lightheaded of dizzy.  She is taking motrin/tylenol for pain.   I gave bleeding precautions and told her to call back if she needed anything.   I told her the office would call her in the morning and get her in for an appointment for a US to see if she passed all the tissue.   She also probably doesn't need the suction D&C for Wednesday.       Left message for patient to call office back with update on how she is feeling.    Reached out to [facility] and was able to get her scheduled for today at 3:30 pm at the [place] location.  Sent [employer] message making patient aware

## 2024-07-09 NOTE — TELEPHONE ENCOUNTER
Result Communication    No results found from the In Basket message.    11:24 AM      Results {WERE / WERE NOT:89295} successfully communicated with the {RHEUM PARENT/PATIENT:36060} and they {AMB Acknowledged/Did Not Acknowledge:54428} their understanding.

## 2024-07-10 ENCOUNTER — ANESTHESIA EVENT (OUTPATIENT)
Dept: OPERATING ROOM | Facility: HOSPITAL | Age: 24
End: 2024-07-10
Payer: OTHER GOVERNMENT

## 2024-07-10 ENCOUNTER — ANESTHESIA (OUTPATIENT)
Dept: OPERATING ROOM | Facility: HOSPITAL | Age: 24
End: 2024-07-10
Payer: OTHER GOVERNMENT

## 2024-07-10 ENCOUNTER — HOSPITAL ENCOUNTER (OUTPATIENT)
Facility: HOSPITAL | Age: 24
Setting detail: OUTPATIENT SURGERY
Discharge: HOME | End: 2024-07-10
Attending: OBSTETRICS & GYNECOLOGY | Admitting: OBSTETRICS & GYNECOLOGY
Payer: OTHER GOVERNMENT

## 2024-07-10 VITALS
OXYGEN SATURATION: 96 % | RESPIRATION RATE: 18 BRPM | TEMPERATURE: 96.8 F | HEART RATE: 80 BPM | HEIGHT: 65 IN | DIASTOLIC BLOOD PRESSURE: 63 MMHG | SYSTOLIC BLOOD PRESSURE: 100 MMHG | BODY MASS INDEX: 20.83 KG/M2 | WEIGHT: 125 LBS

## 2024-07-10 DIAGNOSIS — O02.1 MISSED ABORTION (HHS-HCC): Primary | ICD-10-CM

## 2024-07-10 PROCEDURE — 96372 THER/PROPH/DIAG INJ SC/IM: CPT | Performed by: OBSTETRICS & GYNECOLOGY

## 2024-07-10 PROCEDURE — 3700000002 HC GENERAL ANESTHESIA TIME - EACH INCREMENTAL 1 MINUTE: Performed by: OBSTETRICS & GYNECOLOGY

## 2024-07-10 PROCEDURE — 2500000005 HC RX 250 GENERAL PHARMACY W/O HCPCS: Performed by: OBSTETRICS & GYNECOLOGY

## 2024-07-10 PROCEDURE — 2500000005 HC RX 250 GENERAL PHARMACY W/O HCPCS: Performed by: NURSE ANESTHETIST, CERTIFIED REGISTERED

## 2024-07-10 PROCEDURE — 2500000004 HC RX 250 GENERAL PHARMACY W/ HCPCS (ALT 636 FOR OP/ED): Performed by: NURSE ANESTHETIST, CERTIFIED REGISTERED

## 2024-07-10 PROCEDURE — 2500000004 HC RX 250 GENERAL PHARMACY W/ HCPCS (ALT 636 FOR OP/ED): Performed by: OBSTETRICS & GYNECOLOGY

## 2024-07-10 PROCEDURE — A59812 PR SURG RX INCOMPLETE ABORTN: Performed by: NURSE ANESTHETIST, CERTIFIED REGISTERED

## 2024-07-10 PROCEDURE — 3600000003 HC OR TIME - INITIAL BASE CHARGE - PROCEDURE LEVEL THREE: Performed by: OBSTETRICS & GYNECOLOGY

## 2024-07-10 PROCEDURE — 7100000001 HC RECOVERY ROOM TIME - INITIAL BASE CHARGE: Performed by: OBSTETRICS & GYNECOLOGY

## 2024-07-10 PROCEDURE — 7100000009 HC PHASE TWO TIME - INITIAL BASE CHARGE: Performed by: OBSTETRICS & GYNECOLOGY

## 2024-07-10 PROCEDURE — A59812 PR SURG RX INCOMPLETE ABORTN: Performed by: STUDENT IN AN ORGANIZED HEALTH CARE EDUCATION/TRAINING PROGRAM

## 2024-07-10 PROCEDURE — 3700000001 HC GENERAL ANESTHESIA TIME - INITIAL BASE CHARGE: Performed by: OBSTETRICS & GYNECOLOGY

## 2024-07-10 PROCEDURE — 2500000001 HC RX 250 WO HCPCS SELF ADMINISTERED DRUGS (ALT 637 FOR MEDICARE OP): Performed by: OBSTETRICS & GYNECOLOGY

## 2024-07-10 PROCEDURE — 7100000010 HC PHASE TWO TIME - EACH INCREMENTAL 1 MINUTE: Performed by: OBSTETRICS & GYNECOLOGY

## 2024-07-10 PROCEDURE — 7100000002 HC RECOVERY ROOM TIME - EACH INCREMENTAL 1 MINUTE: Performed by: OBSTETRICS & GYNECOLOGY

## 2024-07-10 PROCEDURE — 3600000008 HC OR TIME - EACH INCREMENTAL 1 MINUTE - PROCEDURE LEVEL THREE: Performed by: OBSTETRICS & GYNECOLOGY

## 2024-07-10 RX ORDER — LABETALOL HYDROCHLORIDE 5 MG/ML
5 INJECTION, SOLUTION INTRAVENOUS ONCE AS NEEDED
Status: DISCONTINUED | OUTPATIENT
Start: 2024-07-10 | End: 2024-07-10 | Stop reason: HOSPADM

## 2024-07-10 RX ORDER — KETOROLAC TROMETHAMINE 30 MG/ML
INJECTION, SOLUTION INTRAMUSCULAR; INTRAVENOUS AS NEEDED
Status: DISCONTINUED | OUTPATIENT
Start: 2024-07-10 | End: 2024-07-10

## 2024-07-10 RX ORDER — FENTANYL CITRATE 50 UG/ML
50 INJECTION, SOLUTION INTRAMUSCULAR; INTRAVENOUS EVERY 5 MIN PRN
Status: DISCONTINUED | OUTPATIENT
Start: 2024-07-10 | End: 2024-07-10 | Stop reason: HOSPADM

## 2024-07-10 RX ORDER — ALBUTEROL SULFATE 0.83 MG/ML
2.5 SOLUTION RESPIRATORY (INHALATION) ONCE AS NEEDED
Status: DISCONTINUED | OUTPATIENT
Start: 2024-07-10 | End: 2024-07-10 | Stop reason: HOSPADM

## 2024-07-10 RX ORDER — ONDANSETRON HYDROCHLORIDE 2 MG/ML
INJECTION, SOLUTION INTRAVENOUS AS NEEDED
Status: DISCONTINUED | OUTPATIENT
Start: 2024-07-10 | End: 2024-07-10

## 2024-07-10 RX ORDER — SODIUM CHLORIDE, SODIUM LACTATE, POTASSIUM CHLORIDE, CALCIUM CHLORIDE 600; 310; 30; 20 MG/100ML; MG/100ML; MG/100ML; MG/100ML
INJECTION, SOLUTION INTRAVENOUS CONTINUOUS PRN
Status: DISCONTINUED | OUTPATIENT
Start: 2024-07-10 | End: 2024-07-10

## 2024-07-10 RX ORDER — MIDAZOLAM HYDROCHLORIDE 1 MG/ML
INJECTION, SOLUTION INTRAMUSCULAR; INTRAVENOUS AS NEEDED
Status: DISCONTINUED | OUTPATIENT
Start: 2024-07-10 | End: 2024-07-10

## 2024-07-10 RX ORDER — SODIUM CHLORIDE, SODIUM LACTATE, POTASSIUM CHLORIDE, CALCIUM CHLORIDE 600; 310; 30; 20 MG/100ML; MG/100ML; MG/100ML; MG/100ML
100 INJECTION, SOLUTION INTRAVENOUS CONTINUOUS
Status: DISCONTINUED | OUTPATIENT
Start: 2024-07-10 | End: 2024-07-10 | Stop reason: HOSPADM

## 2024-07-10 RX ORDER — PROPOFOL 10 MG/ML
INJECTION, EMULSION INTRAVENOUS AS NEEDED
Status: DISCONTINUED | OUTPATIENT
Start: 2024-07-10 | End: 2024-07-10

## 2024-07-10 RX ORDER — HYDRALAZINE HYDROCHLORIDE 20 MG/ML
5 INJECTION INTRAMUSCULAR; INTRAVENOUS EVERY 30 MIN PRN
Status: DISCONTINUED | OUTPATIENT
Start: 2024-07-10 | End: 2024-07-10 | Stop reason: HOSPADM

## 2024-07-10 RX ORDER — ACETAMINOPHEN 325 MG/1
975 TABLET ORAL ONCE
Status: COMPLETED | OUTPATIENT
Start: 2024-07-10 | End: 2024-07-10

## 2024-07-10 RX ORDER — GABAPENTIN 600 MG/1
600 TABLET ORAL ONCE
Status: COMPLETED | OUTPATIENT
Start: 2024-07-10 | End: 2024-07-10

## 2024-07-10 RX ORDER — LIDOCAINE HYDROCHLORIDE 10 MG/ML
0.1 INJECTION INFILTRATION; PERINEURAL ONCE
Status: CANCELLED | OUTPATIENT
Start: 2024-07-10 | End: 2024-07-10

## 2024-07-10 RX ORDER — ONDANSETRON HYDROCHLORIDE 2 MG/ML
4 INJECTION, SOLUTION INTRAVENOUS ONCE AS NEEDED
Status: DISCONTINUED | OUTPATIENT
Start: 2024-07-10 | End: 2024-07-10 | Stop reason: HOSPADM

## 2024-07-10 RX ORDER — CELECOXIB 200 MG/1
400 CAPSULE ORAL ONCE
Status: COMPLETED | OUTPATIENT
Start: 2024-07-10 | End: 2024-07-10

## 2024-07-10 RX ORDER — FENTANYL CITRATE 50 UG/ML
INJECTION, SOLUTION INTRAMUSCULAR; INTRAVENOUS AS NEEDED
Status: DISCONTINUED | OUTPATIENT
Start: 2024-07-10 | End: 2024-07-10

## 2024-07-10 RX ORDER — OXYCODONE HYDROCHLORIDE 5 MG/1
5 TABLET ORAL EVERY 4 HOURS PRN
Status: DISCONTINUED | OUTPATIENT
Start: 2024-07-10 | End: 2024-07-10 | Stop reason: HOSPADM

## 2024-07-10 RX ORDER — LIDOCAINE HYDROCHLORIDE 20 MG/ML
INJECTION, SOLUTION EPIDURAL; INFILTRATION; INTRACAUDAL; PERINEURAL AS NEEDED
Status: DISCONTINUED | OUTPATIENT
Start: 2024-07-10 | End: 2024-07-10

## 2024-07-10 ASSESSMENT — ENCOUNTER SYMPTOMS
CONSTITUTIONAL NEGATIVE: 1
CARDIOVASCULAR NEGATIVE: 1
EYES NEGATIVE: 1
GASTROINTESTINAL NEGATIVE: 1
RESPIRATORY NEGATIVE: 1

## 2024-07-10 ASSESSMENT — PAIN - FUNCTIONAL ASSESSMENT
PAIN_FUNCTIONAL_ASSESSMENT: 0-10
PAIN_FUNCTIONAL_ASSESSMENT: UNABLE TO SELF-REPORT
PAIN_FUNCTIONAL_ASSESSMENT: 0-10
PAIN_FUNCTIONAL_ASSESSMENT: UNABLE TO SELF-REPORT

## 2024-07-10 ASSESSMENT — PAIN SCALES - GENERAL
PAINLEVEL_OUTOF10: 0 - NO PAIN
PAINLEVEL_OUTOF10: 0 - NO PAIN
PAINLEVEL_OUTOF10: 5 - MODERATE PAIN
PAINLEVEL_OUTOF10: 0 - NO PAIN

## 2024-07-10 ASSESSMENT — COLUMBIA-SUICIDE SEVERITY RATING SCALE - C-SSRS
6. HAVE YOU EVER DONE ANYTHING, STARTED TO DO ANYTHING, OR PREPARED TO DO ANYTHING TO END YOUR LIFE?: NO
6. HAVE YOU EVER DONE ANYTHING, STARTED TO DO ANYTHING, OR PREPARED TO DO ANYTHING TO END YOUR LIFE?: YES
1. IN THE PAST MONTH, HAVE YOU WISHED YOU WERE DEAD OR WISHED YOU COULD GO TO SLEEP AND NOT WAKE UP?: NO
2. HAVE YOU ACTUALLY HAD ANY THOUGHTS OF KILLING YOURSELF?: NO

## 2024-07-10 NOTE — ANESTHESIA POSTPROCEDURE EVALUATION
Patient: Nelson Osborn    Procedure Summary       Date: 07/10/24 Room / Location: [facility] OR 03 /  [facility] OR    Anesthesia Start: 1632 Anesthesia Stop: 1700    Procedure: D  and  C Suction Diagnosis:       Missed  ([facility])      (Missed  ([facility]) [O02.1])    Surgeons: Cristy Lares MD Responsible Provider: Gloria Castro MD    Anesthesia Type: general ASA Status: 2            Anesthesia Type: general    Vitals Value Taken Time   BP 92/57 07/10/24 1730   Temp 36 °C (96.8 °F) 07/10/24 1730   Pulse 86 07/10/24 1732   Resp 37 07/10/24 1732   SpO2 98 % 07/10/24 1732   Vitals shown include unfiled device data.    Anesthesia Post Evaluation    Patient location during evaluation: PACU  Patient participation: complete - patient participated  Level of consciousness: awake and alert  Pain management: adequate  Airway patency: patent  Cardiovascular status: acceptable  Respiratory status: acceptable  Hydration status: acceptable  Postoperative Nausea and Vomiting: none        No notable events documented.

## 2024-07-10 NOTE — H&P
History Of Present Illness  Nelson Osborn is a 24 y.o. female presenting with retained products of conception after SAB.  Known missed  at 8 wks, was planned for D&C today however passed pregnancy at home overnight .  Had an ultrasound yesterday demonstrating retained products of conception in uterus (no fetus/GS present) and so will proceed with planned D&C today for rPOC.      Past Medical History  Past Medical History:   Diagnosis Date    Addiction to drug (Multi)     ADHD (attention deficit hyperactivity disorder)     Alcohol abuse     Alcoholism (Multi)     Anxiety     Bipolar 1 disorder (Multi)     Depression     Hallucination     Memory loss     Panic attack     PTSD (post-traumatic stress disorder)     Schizoaffective disorder (Multi)     Sleep difficulties     Substance abuse (Multi)     Suicide attempt (Multi)        Surgical History  Past Surgical History:   Procedure Laterality Date    APPENDECTOMY      HERNIA REPAIR      WISDOM TOOTH EXTRACTION          Social History  She reports that she has been smoking cigarettes. She has a 5 pack-year smoking history. She has been exposed to tobacco smoke. She has never used smokeless tobacco. She reports that she does not currently use alcohol. She reports that she does not currently use drugs after having used the following drugs: Cocaine and MDMA (ecstacy).    Family History  Family History   Problem Relation Name Age of Onset    Hypertension Mother      Colon polyps Mother      Sleep apnea Father      Other (dm 2) Maternal Grandmother      Colon cancer Maternal Grandfather      Sleep apnea Paternal Grandmother          Allergies  Patient has no known allergies.    Review of Systems   Constitutional: Negative.    HENT: Negative.     Eyes: Negative.    Respiratory: Negative.     Cardiovascular: Negative.    Gastrointestinal: Negative.    Genitourinary:  Positive for vaginal bleeding.        Physical Exam   Gen in NAD  Abdomen soft, nontender,  nondistended    Last Recorded Vitals  There were no vitals filed for this visit.       Assessment/Plan   Principal Problem:    Missed  ([facility])    23 y/o  presenting for D&C for rPOC after SAB at 8 wks.  Discussed risks of surgery including bleeding, infection, uterine perforation and consent signed.  Doxycycline 100 mg IV for periop ppx.  Rh neg, for rhogam in PACU.       Cristy Lares MD

## 2024-07-10 NOTE — ANESTHESIA PROCEDURE NOTES
Airway  Date/Time: 7/10/2024 4:37 PM  Urgency: elective      Staffing  Performed: CRNA   Authorized by: Gloria Castro MD    Performed by: CHAPINCITO De Leon-JACQUELINE  Patient location during procedure: OR    Indications and Patient Condition  Indications for airway management: anesthesia  Sedation level: deep  Preoxygenated: yes  Mask difficulty assessment: 1 - vent by mask    Final Airway Details  Final airway type: supraglottic airway      Successful airway: Supreme  Size 3     Number of attempts at approach: 1

## 2024-07-10 NOTE — ANESTHESIA PREPROCEDURE EVALUATION
Patient: Nelson Osborn    Procedure Information       Date/Time: 07/10/24 1400    Procedure: D  and  C Suction    Location: [facility] OR 03 / [facility]    Surgeons: Cristy Lares MD            Relevant Problems   Anesthesia (within normal limits)      Neuro   (+) Bipolar 1 disorder (Multi)   (+) DMOO (generalized anxiety disorder)   (+) PTSD (post-traumatic stress disorder)      Gravid and    (+) Missed  ([facility])       Clinical information reviewed:   Tobacco  Allergies  Meds   Med Hx  Surg Hx  OB Status  Fam Hx  Soc   Hx        NPO Detail:  NPO/Void Status  Date of Last Liquid: 07/10/24  Time of Last Liquid: 0800  Date of Last Solid: 24  Time of Last Solid: 2330  Time of Last Void: 1307         Physical Exam    Airway  Mallampati: I  TM distance: >3 FB  Neck ROM: full     Cardiovascular   Rhythm: regular  Rate: normal     Dental - normal exam     Pulmonary   Breath sounds clear to auscultation     Abdominal   Abdomen: soft             Anesthesia Plan    History of general anesthesia?: yes  History of complications of general anesthesia?: no    ASA 2     general     intravenous induction   Postoperative administration of opioids is intended.  Anesthetic plan and risks discussed with patient.  Use of blood products discussed with patient who consented to blood products.    Plan discussed with attending.

## 2024-07-10 NOTE — OP NOTE
Preoperative diagnosis: retained POC after 8 wk MAB    Postoperative diagnosis: Same     Operative procedure: Dilation and suction curettage     EBL - <50cc    Antibiotics - Doxycycline 100mg IV     Patient’s Rh status: negative    Indications for procedure:  24 y.o.  presenting for D&C for retained POC after known SAB at 8 wks.  Risks of bleeding, infection, uterine perforation discussed and consent signed.     Description of procedure:  The patient was taken to the operating room and placed on the operating room table.  General anesthesia was administered and the patient was placed in dorsal lithotomy position with her legs in Daniel stirrups.  SCDs were placed and turned on.  Her vulva, vagina and perineum were prepped and draped in usual sterile fashion.  A stop check was then performed confirming the proper patient and procedure to be performed.      A bivalve speculum was then placed in the patient’s vagina and the anterior lip of the cervix was grasped with a single toothed tenaculum.  The patient’s cervix was progressively dilated using Raheem dilators.  An 7 mm rigid suction curette was then inserted.  Suction was applied and the retained pregnancy tissue was removed with several passes.   Good grit was then felt throughout and there was minimal bleeding noted from the uterus and cervix.      The tenaculum was removed from the cervix.  Minimal bleeding was again noted.  All instruments were then removed from the patient’s vagina.  The patient was then cleaned and dried and placed back in dorsal position.  Anesthesia was reversed and she was taken to PACU in stable and satisfactory condition.  All sponge, needle, and instrument counts were correct at the end of the procedure.   Rhogam given in OR. The patient tolerated the procedure well.    Cristy Lares MD

## 2024-07-11 ENCOUNTER — APPOINTMENT (OUTPATIENT)
Dept: BEHAVIORAL HEALTH | Facility: CLINIC | Age: 24
End: 2024-07-11
Payer: OTHER GOVERNMENT

## 2024-07-15 ENCOUNTER — APPOINTMENT (OUTPATIENT)
Dept: BEHAVIORAL HEALTH | Facility: CLINIC | Age: 24
End: 2024-07-15
Payer: OTHER GOVERNMENT

## 2024-07-15 DIAGNOSIS — F43.10 PTSD (POST-TRAUMATIC STRESS DISORDER): ICD-10-CM

## 2024-07-15 DIAGNOSIS — F41.1 GAD (GENERALIZED ANXIETY DISORDER): ICD-10-CM

## 2024-07-15 DIAGNOSIS — F31.9 BIPOLAR 1 DISORDER (MULTI): ICD-10-CM

## 2024-07-15 LAB
LABORATORY COMMENT REPORT: NORMAL
PATH REPORT.FINAL DX SPEC: NORMAL
PATH REPORT.GROSS SPEC: NORMAL
PATH REPORT.RELEVANT HX SPEC: NORMAL
PATH REPORT.TOTAL CANCER: NORMAL

## 2024-07-15 PROCEDURE — 99214 OFFICE O/P EST MOD 30 MIN: CPT

## 2024-07-15 RX ORDER — ACETAMINOPHEN, DIPHENHYDRAMINE HCL, PHENYLEPHRINE HCL 325; 25; 5 MG/1; MG/1; MG/1
10 TABLET ORAL DAILY
Qty: 90 TABLET | Refills: 0 | Status: SHIPPED | OUTPATIENT
Start: 2024-08-19 | End: 2024-11-17

## 2024-07-15 RX ORDER — ARIPIPRAZOLE 15 MG/1
15 TABLET ORAL DAILY
Qty: 90 TABLET | Refills: 0 | Status: SHIPPED | OUTPATIENT
Start: 2024-08-19 | End: 2024-11-17

## 2024-07-15 RX ORDER — SERTRALINE HYDROCHLORIDE 50 MG/1
50 TABLET, FILM COATED ORAL DAILY
Qty: 90 TABLET | Refills: 0 | Status: SHIPPED | OUTPATIENT
Start: 2024-08-19 | End: 2024-11-17

## 2024-07-15 RX ORDER — BUSPIRONE HYDROCHLORIDE 5 MG/1
5 TABLET ORAL 3 TIMES DAILY
Qty: 270 TABLET | Refills: 0 | Status: SHIPPED | OUTPATIENT
Start: 2024-07-25 | End: 2024-10-23

## 2024-07-15 RX ORDER — HYDROXYZINE PAMOATE 25 MG/1
25 CAPSULE ORAL 3 TIMES DAILY PRN
Qty: 90 CAPSULE | Refills: 0 | Status: SHIPPED | OUTPATIENT
Start: 2024-08-19 | End: 2024-11-17

## 2024-07-15 RX ORDER — PRAZOSIN HYDROCHLORIDE 1 MG/1
1 CAPSULE ORAL NIGHTLY
Qty: 90 CAPSULE | Refills: 0 | Status: SHIPPED | OUTPATIENT
Start: 2024-08-19 | End: 2024-11-17

## 2024-07-15 ASSESSMENT — ANXIETY QUESTIONNAIRES
GAD7 TOTAL SCORE: 21
4. TROUBLE RELAXING: NEARLY EVERY DAY
3. WORRYING TOO MUCH ABOUT DIFFERENT THINGS: NEARLY EVERY DAY
1. FEELING NERVOUS, ANXIOUS, OR ON EDGE: NEARLY EVERY DAY
7. FEELING AFRAID AS IF SOMETHING AWFUL MIGHT HAPPEN: NEARLY EVERY DAY
5. BEING SO RESTLESS THAT IT IS HARD TO SIT STILL: NEARLY EVERY DAY
2. NOT BEING ABLE TO STOP OR CONTROL WORRYING: NEARLY EVERY DAY
IF YOU CHECKED OFF ANY PROBLEMS ON THIS QUESTIONNAIRE, HOW DIFFICULT HAVE THESE PROBLEMS MADE IT FOR YOU TO DO YOUR WORK, TAKE CARE OF THINGS AT HOME, OR GET ALONG WITH OTHER PEOPLE: VERY DIFFICULT
6. BECOMING EASILY ANNOYED OR IRRITABLE: NEARLY EVERY DAY

## 2024-07-15 ASSESSMENT — ENCOUNTER SYMPTOMS
NERVOUS/ANXIOUS: 1
CONSTITUTIONAL NEGATIVE: 1

## 2024-07-16 ENCOUNTER — TELEPHONE (OUTPATIENT)
Dept: BEHAVIORAL HEALTH | Facility: CLINIC | Age: 24
End: 2024-07-16

## 2024-07-16 ENCOUNTER — APPOINTMENT (OUTPATIENT)
Dept: OBSTETRICS AND GYNECOLOGY | Facility: CLINIC | Age: 24
End: 2024-07-16
Payer: OTHER GOVERNMENT

## 2024-07-16 VITALS — DIASTOLIC BLOOD PRESSURE: 60 MMHG | SYSTOLIC BLOOD PRESSURE: 97 MMHG | BODY MASS INDEX: 21.53 KG/M2 | WEIGHT: 129.38 LBS

## 2024-07-16 DIAGNOSIS — Z30.42 ENCOUNTER FOR MANAGEMENT AND INJECTION OF DEPO-PROVERA: ICD-10-CM

## 2024-07-16 DIAGNOSIS — Z48.89 POSTOPERATIVE VISIT: Primary | ICD-10-CM

## 2024-07-16 LAB — PREGNANCY TEST URINE, POC: POSITIVE

## 2024-07-16 PROCEDURE — 96372 THER/PROPH/DIAG INJ SC/IM: CPT | Performed by: OBSTETRICS & GYNECOLOGY

## 2024-07-16 PROCEDURE — 99024 POSTOP FOLLOW-UP VISIT: CPT | Performed by: OBSTETRICS & GYNECOLOGY

## 2024-07-16 PROCEDURE — 81025 URINE PREGNANCY TEST: CPT | Performed by: OBSTETRICS & GYNECOLOGY

## 2024-07-16 RX ORDER — MEDROXYPROGESTERONE ACETATE 150 MG/ML
150 INJECTION, SUSPENSION INTRAMUSCULAR ONCE
Status: COMPLETED | OUTPATIENT
Start: 2024-07-16 | End: 2024-07-16

## 2024-07-16 ASSESSMENT — ENCOUNTER SYMPTOMS
CARDIOVASCULAR NEGATIVE: 1
EYES NEGATIVE: 1
ENDOCRINE NEGATIVE: 1
CONSTITUTIONAL NEGATIVE: 1
RESPIRATORY NEGATIVE: 1

## 2024-07-16 NOTE — PROGRESS NOTES
Patient here for post op visit   D&C 7/10/2024    ROSALES Moore    Subjective   Patient ID: Nelson Osborn is a 24 y.o. female who presents for Post-op (7/10/2024).    HPI  23 y/o  presenting for postop visit POD #6 s/p D&C for rPOC after incomplete Ab. Overall doing well.  Pathology c/w villi.  Having some occasional cramping.  No longer bleeding.    Review of Systems   Constitutional: Negative.    HENT: Negative.     Eyes: Negative.    Respiratory: Negative.     Cardiovascular: Negative.    Endocrine: Negative.    Genitourinary:  Positive for pelvic pain.     Objective   Physical Exam  Gen in NAD    Assessment/Plan   23 y/o  POD #6 s/p D&C for rPOC after incomplete Ab, doing well.  Reviewed pathology with patient.  Desires depo for birth control, will give today.  UPT still faintly positive but patient is only 6 days from her D&C so depo okay to give.     Cristy Lares MD 24 2:12 PM

## 2024-07-16 NOTE — PROGRESS NOTES
Pt here today for Depo Provera Injection   Last Depo N/A  Next Depo due 10/01/2024 - 10/15/2024  Complaints with being on Depo Provera N/A  Pt given Depo Provera without difficulties   office supplied  Pt tolerated injection well  Education offered       Kenia Merida, A

## 2024-07-24 ENCOUNTER — APPOINTMENT (OUTPATIENT)
Dept: OBSTETRICS AND GYNECOLOGY | Facility: CLINIC | Age: 24
End: 2024-07-24
Payer: OTHER GOVERNMENT

## 2024-08-26 ENCOUNTER — APPOINTMENT (OUTPATIENT)
Dept: BEHAVIORAL HEALTH | Facility: CLINIC | Age: 24
End: 2024-08-26
Payer: OTHER GOVERNMENT

## 2024-08-26 DIAGNOSIS — F41.1 GAD (GENERALIZED ANXIETY DISORDER): ICD-10-CM

## 2024-08-26 DIAGNOSIS — F31.9 BIPOLAR 1 DISORDER (MULTI): ICD-10-CM

## 2024-08-26 DIAGNOSIS — F43.10 PTSD (POST-TRAUMATIC STRESS DISORDER): ICD-10-CM

## 2024-08-26 PROCEDURE — 99214 OFFICE O/P EST MOD 30 MIN: CPT

## 2024-08-26 RX ORDER — BUSPIRONE HYDROCHLORIDE 5 MG/1
5 TABLET ORAL 3 TIMES DAILY
Qty: 270 TABLET | Refills: 0 | Status: SHIPPED | OUTPATIENT
Start: 2024-08-26 | End: 2024-11-24

## 2024-08-26 RX ORDER — ACETAMINOPHEN, DIPHENHYDRAMINE HCL, PHENYLEPHRINE HCL 325; 25; 5 MG/1; MG/1; MG/1
10 TABLET ORAL DAILY
Qty: 90 TABLET | Refills: 0 | Status: SHIPPED | OUTPATIENT
Start: 2024-11-12 | End: 2025-02-10

## 2024-08-26 RX ORDER — HYDROXYZINE PAMOATE 25 MG/1
25 CAPSULE ORAL 3 TIMES DAILY PRN
Qty: 270 CAPSULE | Refills: 0 | Status: SHIPPED | OUTPATIENT
Start: 2024-11-12 | End: 2025-02-10

## 2024-08-26 RX ORDER — PRAZOSIN HYDROCHLORIDE 1 MG/1
1 CAPSULE ORAL NIGHTLY
Qty: 90 CAPSULE | Refills: 0 | Status: SHIPPED | OUTPATIENT
Start: 2024-11-12 | End: 2025-02-10

## 2024-08-26 RX ORDER — SERTRALINE HYDROCHLORIDE 50 MG/1
50 TABLET, FILM COATED ORAL DAILY
Qty: 90 TABLET | Refills: 0 | Status: SHIPPED | OUTPATIENT
Start: 2024-11-12 | End: 2025-02-10

## 2024-08-26 RX ORDER — ARIPIPRAZOLE 15 MG/1
15 TABLET ORAL DAILY
Qty: 90 TABLET | Refills: 0 | Status: SHIPPED | OUTPATIENT
Start: 2024-11-12 | End: 2025-02-10

## 2024-08-26 ASSESSMENT — ENCOUNTER SYMPTOMS
PSYCHIATRIC NEGATIVE: 1
CONSTITUTIONAL NEGATIVE: 1

## 2024-08-26 NOTE — PROGRESS NOTES
"  Virtual or Telephone Consent    An interactive audio and video telecommunication system which permits real time communications between the patient (at the originating site) and provider (at the distant site) was utilized to provide this telehealth service.   Verbal consent was requested and obtained from Nelson Osborn on this date, 08/26/24 for a telehealth visit.       Assessment/Plan     Impression:  Nelson Osborn is a 24 y.o. female who presents via Telehealth for a follow up visit with CC of  \" I am doing good. \"    Patient consents to treatment.    Problem List Items Addressed This Visit             ICD-10-CM    Bipolar 1 disorder (Multi) F31.9    Relevant Medications    ARIPiprazole (Abilify) 15 mg tablet (Start on 11/12/2024)    sertraline (Zoloft) 50 mg tablet (Start on 11/12/2024)    DOMO (generalized anxiety disorder) F41.1    Relevant Medications    busPIRone (Buspar) 5 mg tablet    hydrOXYzine pamoate (Vistaril) 25 mg capsule (Start on 11/12/2024)    melatonin 10 mg tablet (Start on 11/12/2024)    sertraline (Zoloft) 50 mg tablet (Start on 11/12/2024)    PTSD (post-traumatic stress disorder) F43.10    Relevant Medications    prazosin (Minipress) 1 mg capsule (Start on 11/12/2024)       Patient is reminded that if there is SI to call [phone] and get themselves to the closest ED for evaluation, otherwise contact me for other questions/concerns.    Patient presenting to outpatient treatment for a scheduled psych follow up visit.      HPI   Background:     Patient was seen a couple of weeks ago for management of anxiety, depression and mood regulation.  Last visit patient was advised to take Buspar 10 mg in the mornings and 5 mg at night to manage anxiety. Patient explains the current combination has helped with managing symptoms.  Patient reports the medications have helped with anxiety mood regulation, and depression  Patient hopes to keep felling better.    Characteristics/Recent psychiatric symptoms " (pertinent positives and negatives):    Denies anxiety, denies depressive symptoms. Denies johann.  Reports getting 5-8 hours of sleep. Reports appetite is good. Anxiety has improved.   Denies wishes for death or consideration for suicide.  CSSRS negative.   Patient does explain that current dose of Buspar, Abilify, Minipress, Zoloft, Vistaril have been successful in the management of anxiety and depressive symptoms.     -SI/HI : Denies        Psychiatric Review Of Systems:  Depressive Symptoms: negative  Manic Symptoms: negative    Anxiety Symptoms: Negative  Psychotic Symptoms: negative      REVIEW OF SYSTEMS  Review of Systems   Constitutional: Negative.    Psychiatric/Behavioral: Negative.       All other ROS negative    Current Medications:    Current Outpatient Medications:     [START ON 11/12/2024] ARIPiprazole (Abilify) 15 mg tablet, Take 1 tablet (15 mg) by mouth once daily. Take half a dose ( 7.5 mg) in the morning Do not fill before November 12, 2024., Disp: 90 tablet, Rfl: 0    busPIRone (Buspar) 5 mg tablet, Take 1 tablet (5 mg) by mouth 3 times a day. Take two pill in the morning and one pill at night, Disp: 270 tablet, Rfl: 0    [START ON 11/12/2024] hydrOXYzine pamoate (Vistaril) 25 mg capsule, Take 1 capsule (25 mg) by mouth 3 times a day as needed for anxiety. Take 1 pill every 8 hours as needed for anxiety Do not fill before November 12, 2024., Disp: 270 capsule, Rfl: 0    [START ON 11/12/2024] melatonin 10 mg tablet, Take 1 tablet (10 mg) by mouth once daily. Take 1 pill before bed Do not fill before November 12, 2024., Disp: 90 tablet, Rfl: 0    [START ON 11/12/2024] prazosin (Minipress) 1 mg capsule, Take 1 capsule (1 mg) by mouth once daily at bedtime. Take 1 pill at bed time Do not fill before November 12, 2024., Disp: 90 capsule, Rfl: 0    [START ON 11/12/2024] sertraline (Zoloft) 50 mg tablet, Take 1 tablet (50 mg) by mouth once daily. Take 1 pill in the morning Do not fill before November  12, 2024., Disp: 90 tablet, Rfl: 0     Medical History:  Past Medical History:   Diagnosis Date    Addiction to drug (Multi)     ADHD (attention deficit hyperactivity disorder)     Alcohol abuse     Alcoholism (Multi)     Anxiety     Bipolar 1 disorder (Multi)     Depression     Hallucination     Memory loss     Panic attack     PTSD (post-traumatic stress disorder)     Schizoaffective disorder (Multi)     Sleep difficulties     Substance abuse (Multi)     Suicide attempt (Multi)      Surgical History:  Past Surgical History:   Procedure Laterality Date    APPENDECTOMY      HERNIA REPAIR      WISDOM TOOTH EXTRACTION       Family History:  Family History   Problem Relation Name Age of Onset    Hypertension Mother      Colon polyps Mother      Sleep apnea Father      Other (dm 2) Maternal Grandmother      Colon cancer Maternal Grandfather      Sleep apnea Paternal Grandmother       Social History:  Social History     Socioeconomic History    Marital status: Single     Spouse name: Not on file    Number of children: Not on file    Years of education: Not on file    Highest education level: Not on file   Occupational History    Not on file   Tobacco Use    Smoking status: Every Day     Current packs/day: 1.00     Average packs/day: 1 pack/day for 5.0 years (5.0 ttl pk-yrs)     Types: Cigarettes     Passive exposure: Current    Smokeless tobacco: Never    Tobacco comments:     Vapes daily   Vaping Use    Vaping status: Every Day    Substances: Nicotine   Substance and Sexual Activity    Alcohol use: Not Currently    Drug use: Not Currently     Types: Cocaine, MDMA (ecstacy)     Comment: history of coacaine, ecstacy and marijuana use    Sexual activity: Yes     Partners: Female, Male     Birth control/protection: Condom Male   Other Topics Concern    Not on file   Social History Narrative    Not on file     Social Determinants of Health     Financial Resource Strain: Not on file   Food Insecurity: Not on file  "  Transportation Needs: Not on file   Physical Activity: Not on file   Stress: Not on file   Social Connections: Not on file   Intimate Partner Violence: Not on file     Vitals:  There were no vitals filed for this visit.  Allergies:  No Known Allergies    -PCP: Romina Cloud MD  -Pt reports currently is not pregnant, and currently is sexually active, uses birth control. Depo shot   -TBI/head trauma/LOC/seizure hx: Denies    Objective   Appearance: Well groomed    Attitude: Calm, cooperative, and engaged in conversation.    Behavior: Appropriate eye contact.     Motor Activity: No psychomotor agitation or retardation. No abnormal movements, tremors or tics. No evidence of extrapyramidal symptoms or tardive dyskinesia.    Speech: Regular rate, rhythm, volume. Spontaneous, no pressured speech.    Mood:  \" I am good \"    Affect:  full range, mood congruent.    Thought Process: Linear, logical, and goal-directed. No loose associations or gross thought disorganization.    Thought Content: Denied current suicidal ideation or thoughts of harm to self, denied homicidal ideation or thoughts of harm to others. No delusional thinking elicited. No perseverations or obsessions identified.     Perception: Did not endorse auditory or visual hallucinations, did not appear to be responding to hallucinatory stimuli.     Cognition: Alert, oriented x3. Preserved attention span and concentration, recent and remote memory. Adequate fund of knowledge. No deficits in language.     Insight: Fair, in regards to understanding mental health condition    Judgement: Fair        Physical Exam    IMPRESSION  -FULL Benefit  for anxiety, depression, sleep and mood regulation symptoms with current psychiatric medications, recommend to KEEP the dose.    -Nelson was seen today for anxiety, depression, med management, mdd (major depressive disorder) and panic attack.  Diagnoses and all orders for this visit:  DOMO (generalized anxiety " disorder)  -     busPIRone (Buspar) 5 mg tablet; Take 1 tablet (5 mg) by mouth 3 times a day. Take two pill in the morning and one pill at night  -     hydrOXYzine pamoate (Vistaril) 25 mg capsule; Take 1 capsule (25 mg) by mouth 3 times a day as needed for anxiety. Take 1 pill every 8 hours as needed for anxiety Do not fill before November 12, 2024.  -     melatonin 10 mg tablet; Take 1 tablet (10 mg) by mouth once daily. Take 1 pill before bed Do not fill before November 12, 2024.  -     sertraline (Zoloft) 50 mg tablet; Take 1 tablet (50 mg) by mouth once daily. Take 1 pill in the morning Do not fill before November 12, 2024.  Bipolar 1 disorder (Multi)  -     ARIPiprazole (Abilify) 15 mg tablet; Take 1 tablet (15 mg) by mouth once daily. Take half a dose ( 7.5 mg) in the morning Do not fill before November 12, 2024.  -     sertraline (Zoloft) 50 mg tablet; Take 1 tablet (50 mg) by mouth once daily. Take 1 pill in the morning Do not fill before November 12, 2024.  PTSD (post-traumatic stress disorder)  -     prazosin (Minipress) 1 mg capsule; Take 1 capsule (1 mg) by mouth once daily at bedtime. Take 1 pill at bed time Do not fill before November 12, 2024.         MEDICAL-DECISION MAKING    -Patient educated and verbalized understanding on benefits, risks, and side effects of current psychiatric medications. Continue psychotherapy. Follow-up with psychiatry in 3 months for medication evaluation and effectiveness.        Psych med regimen as follows:  -CONTINUE current psychiatric medications as prescribed  -CONTINUE Psychotherapy  -CONTINUE exercising and eating heathy diet       SI/HI ASSESSMENT  -Patient denied current suicidal ideation or thoughts of harm to self, denied homicidal ideation or thoughts of harm to others. No delusional thinking elicited. No perseverations or obsessions identified.     PLAN  -Continue Medications as directed.  -Follow-up with this provider in 3 months.  -Risks/benefits/assessment  of medication interventions discussed with pt; pt agreeable to plan. Will continue to monitor for symptoms mgmt and SEs and adjust plan as needed.  -MI to increase coping skills/behavior regulation.  -Safety plan reviewed.  -Call  Psychiatry at [phone] with issues.  -For [facility] residents, [employer] is a 24/7 hotline you can call for assistance at [phone]. Please call 911 or go to your closest Emergency Room if you feel worse. This includes thoughts of hurting yourself or anyone else, or having other troubles such as hearing voices, seeing visions, or having new and scary thoughts about the people around you.    OARRS:  Fabby Nayak, APRN-CNP on 8/26/2024  2:40 PM  I have personally reviewed the OARRS report for Nelson Osborn. I have considered the risks of abuse, dependence, addiction and diversion  Medication is felt to be clinically appropriate based on documented diagnosis.    Recent Results (from the past 8760 hour(s))   Drug Screen, Urine    Collection Time: 05/04/24 12:29 AM   Result Value Ref Range    Amphetamine Screen, Urine Presumptive Negative Presumptive Negative    Barbiturate Screen, Urine Presumptive Negative Presumptive Negative    Benzodiazepines Screen, Urine Presumptive Negative Presumptive Negative    Cannabinoid Screen, Urine Presumptive Negative Presumptive Negative    Cocaine Metabolite Screen, Urine Presumptive Positive (A) Presumptive Negative    Fentanyl Screen, Urine Presumptive Negative Presumptive Negative    Opiate Screen, Urine Presumptive Negative Presumptive Negative    Oxycodone Screen, Urine Presumptive Negative Presumptive Negative    PCP Screen, Urine Presumptive Negative Presumptive Negative    Methadone Screen, Urine Presumptive Negative Presumptive Negative   Drug Screen, Urine With Reflex to Confirmation    Collection Time: 05/03/24 12:59 PM   Result Value Ref Range    Amphetamine Screen, Urine Presumptive Negative Presumptive Negative     Barbiturate Screen, Urine Presumptive Negative Presumptive Negative    Benzodiazepines Screen, Urine Presumptive Negative Presumptive Negative    Cannabinoid Screen, Urine Presumptive Negative Presumptive Negative    Cocaine Metabolite Screen, Urine Presumptive Negative Presumptive Negative    Fentanyl Screen, Urine Presumptive Negative Presumptive Negative    Opiate Screen, Urine Presumptive Negative Presumptive Negative    Oxycodone Screen, Urine Presumptive Negative Presumptive Negative    PCP Screen, Urine Presumptive Negative Presumptive Negative    Methadone Screen, Urine Presumptive Negative Presumptive Negative   Confirmation Opiate/Opioid/Benzo Prescription Compliance    Collection Time: 04/29/24 12:01 PM   Result Value Ref Range    Clonazepam <25 <25 ng/mL    7-Aminoclonazepam <25 <25 ng/mL    Alprazolam <25 <25 ng/mL    Alpha-Hydroxyalprazolam <25 <25 ng/mL    Midazolam <25 <25 ng/mL    Alpha-Hydroxymidazolam <25 <25 ng/mL    Chlordiazepoxide <25 <25 ng/mL    Diazepam <25 <25 ng/mL    Nordiazepam <25 <25 ng/mL    Temazepam <25 <25 ng/mL    Oxazepam <25 <25 ng/mL    Lorazepam <25 <25 ng/mL    Methadone <25 <25 ng/mL    EDDP <25 <25 ng/mL    6-Acetylmorphine <25 <25 ng/mL    Codeine <50 <50 ng/mL    Hydrocodone <25 <25 ng/mL    Hydromorphone <25 <25 ng/mL    Morphine  <50 <50 ng/mL    Norhydrocodone <25 <25 ng/mL    Noroxycodone <25 <25 ng/mL    Oxycodone <25 <25 ng/mL    Oxymorphone <25 <25 ng/mL    Fentanyl <2.5 <2.5 ng/mL    Norfentanyl <2.5 <2.5 ng/mL    Tramadol <50 <50 ng/mL    O-Desmethyltramadol <50 <50 ng/mL    Zolpidem <25 <25 ng/mL    Zolpidem Metabolite (ZCA) <25 <25 ng/mL   Screen Opiate/Opioid/Benzo Prescription Compliance    Collection Time: 04/29/24 12:01 PM   Result Value Ref Range    Creatinine, Urine Random 43.1 20.0 - 320.0 mg/dL    Amphetamine Screen, Urine Presumptive Negative Presumptive Negative    Barbiturate Screen, Urine Presumptive Negative Presumptive Negative    Cannabinoid  Screen, Urine Presumptive Negative Presumptive Negative    Cocaine Metabolite Screen, Urine Presumptive Positive (A) Presumptive Negative    PCP Screen, Urine Presumptive Negative Presumptive Negative         Fabby Nayak APRN-CNP  Record Review: extensive    Follow up:   November 14th at 0230 virtually    Time Spent:  Prep:   Direct time: 27 minutes  Documentation: 3 minutes  Total: 30 minutes

## 2024-10-01 ENCOUNTER — APPOINTMENT (OUTPATIENT)
Dept: OBSTETRICS AND GYNECOLOGY | Facility: CLINIC | Age: 24
End: 2024-10-01
Payer: OTHER GOVERNMENT

## 2024-10-10 ENCOUNTER — OFFICE VISIT (OUTPATIENT)
Dept: OBSTETRICS AND GYNECOLOGY | Facility: CLINIC | Age: 24
End: 2024-10-10
Payer: OTHER GOVERNMENT

## 2024-10-10 DIAGNOSIS — Z30.42 ENCOUNTER FOR MANAGEMENT AND INJECTION OF DEPO-PROVERA: Primary | ICD-10-CM

## 2024-10-10 PROCEDURE — 96372 THER/PROPH/DIAG INJ SC/IM: CPT | Performed by: STUDENT IN AN ORGANIZED HEALTH CARE EDUCATION/TRAINING PROGRAM

## 2024-10-10 RX ORDER — MEDROXYPROGESTERONE ACETATE 150 MG/ML
150 INJECTION, SUSPENSION INTRAMUSCULAR ONCE
Status: COMPLETED | OUTPATIENT
Start: 2024-10-10 | End: 2024-10-10

## 2024-10-10 NOTE — PROGRESS NOTES
Patient here today for Depo injection    Last Depo: 07/16/24  Next Depo due: 12/26-01/09/25  Last MARIVEL: 06/18/2024  UPT result (if done): deferred, on time  NDC# 35554-475-34  150mg/mL  Lot# [id number]  Exp.: 11/30/2028  LMP: unknown, spotting all month, light  Complaints with Depo: none  Patient or office supplied: office  Patient given Depo with no difficulties  Patient tolerated injection well  Education offered

## 2024-11-13 ENCOUNTER — APPOINTMENT (OUTPATIENT)
Dept: BEHAVIORAL HEALTH | Facility: CLINIC | Age: 24
End: 2024-11-13
Payer: OTHER GOVERNMENT

## 2024-11-13 DIAGNOSIS — F43.10 PTSD (POST-TRAUMATIC STRESS DISORDER): ICD-10-CM

## 2024-11-13 DIAGNOSIS — F31.9 BIPOLAR 1 DISORDER (MULTI): ICD-10-CM

## 2024-11-13 DIAGNOSIS — F41.1 GAD (GENERALIZED ANXIETY DISORDER): ICD-10-CM

## 2024-11-13 PROCEDURE — 99214 OFFICE O/P EST MOD 30 MIN: CPT

## 2024-11-13 RX ORDER — ACETAMINOPHEN, DIPHENHYDRAMINE HCL, PHENYLEPHRINE HCL 325; 25; 5 MG/1; MG/1; MG/1
10 TABLET ORAL DAILY
Qty: 90 TABLET | Refills: 0 | Status: SHIPPED | OUTPATIENT
Start: 2024-11-13 | End: 2025-02-11

## 2024-11-13 RX ORDER — SERTRALINE HYDROCHLORIDE 50 MG/1
50 TABLET, FILM COATED ORAL DAILY
Qty: 90 TABLET | Refills: 0 | Status: SHIPPED | OUTPATIENT
Start: 2024-11-13 | End: 2025-02-11

## 2024-11-13 RX ORDER — PRAZOSIN HYDROCHLORIDE 1 MG/1
1 CAPSULE ORAL NIGHTLY
Qty: 90 CAPSULE | Refills: 0 | Status: SHIPPED | OUTPATIENT
Start: 2024-11-13 | End: 2025-02-11

## 2024-11-13 RX ORDER — ARIPIPRAZOLE 15 MG/1
15 TABLET ORAL DAILY
Qty: 90 TABLET | Refills: 0 | Status: SHIPPED | OUTPATIENT
Start: 2024-11-13 | End: 2025-02-11

## 2024-11-13 RX ORDER — BUSPIRONE HYDROCHLORIDE 7.5 MG/1
7.5 TABLET ORAL 2 TIMES DAILY
Qty: 180 TABLET | Refills: 0 | Status: SHIPPED | OUTPATIENT
Start: 2024-11-13 | End: 2025-02-11

## 2024-11-13 ASSESSMENT — ENCOUNTER SYMPTOMS
PSYCHIATRIC NEGATIVE: 1
CONSTITUTIONAL NEGATIVE: 1

## 2024-11-13 NOTE — PROGRESS NOTES
"  Virtual or Telephone Consent    An interactive audio and video telecommunication system which permits real time communications between the patient (at the originating site) and provider (at the distant site) was utilized to provide this telehealth service.   Verbal consent was requested and obtained from Nelson Osborn on this date, 11/13/24 for a telehealth visit.       Assessment/Plan     Impression:  Nelson Osborn is a 24 y.o. female who presents via telehealth for a follow up visit with CC of  \" I stopped taking my medications for 2 weeks now due to constantly forgetting to take them . \"    Patient consents to treatment.    Problem List Items Addressed This Visit             ICD-10-CM    Bipolar 1 disorder (Multi) F31.9    Relevant Medications    ARIPiprazole (Abilify) 15 mg tablet    sertraline (Zoloft) 50 mg tablet    DOMO (generalized anxiety disorder) F41.1    Relevant Medications    busPIRone (Buspar) 7.5 mg tablet    melatonin 10 mg tablet    sertraline (Zoloft) 50 mg tablet    PTSD (post-traumatic stress disorder) F43.10    Relevant Medications    prazosin (Minipress) 1 mg capsule       Patient is reminded that if there is SI to call [phone] and get themselves to the closest ED for evaluation, otherwise contact me for other questions/concerns.    Patient presenting to outpatient treatment for a scheduled psych follow up visit.      HPI   Background:     Patient was seen a couple of weeks ago for management of mood dysregulation.   Last visit the dose of current medications were continued. Reports she stopped taking all her medications AMA due to constantly forgetting to take them. Explains that she has not taken her meds for 2 weeks now.  Patient reports she feels fine without the medications, but woke up slightly irritable today.  Patient hopes to keep feeling better.    Characteristics/Recent psychiatric symptoms (pertinent positives and negatives):    Reports irritability today. Reports getting 8 hours of " sleep at night. Reports appetite is okay.  Denies anxiety, denies depressive symptoms, denies panic attacks and johann.  Denies wishes for death or consideration for suicide.  CSSRS negative. Denies johann of psychosis.   -SI/HI : Denies        Psychiatric Review Of Systems:  Depressive Symptoms: negative  Manic Symptoms: negative    Anxiety Symptoms: irritability  Psychotic Symptoms: negative      REVIEW OF SYSTEMS  Review of Systems   Constitutional: Negative.    Psychiatric/Behavioral: Negative.       All other ROS negative    Current Medications:    Current Outpatient Medications:     ARIPiprazole (Abilify) 15 mg tablet, Take 1 tablet (15 mg) by mouth once daily. Take half a dose ( 7.5 mg) in the morning, Disp: 90 tablet, Rfl: 0    busPIRone (Buspar) 7.5 mg tablet, Take 1 tablet (7.5 mg) by mouth 2 times a day., Disp: 180 tablet, Rfl: 0    hydrOXYzine pamoate (Vistaril) 25 mg capsule, Take 1 capsule (25 mg) by mouth 3 times a day as needed for anxiety. Take 1 pill every 8 hours as needed for anxiety Do not fill before November 12, 2024. (Patient not taking: Reported on 11/13/2024), Disp: 270 capsule, Rfl: 0    melatonin 10 mg tablet, Take 1 tablet (10 mg) by mouth once daily. Take 1 pill before bed, Disp: 90 tablet, Rfl: 0    prazosin (Minipress) 1 mg capsule, Take 1 capsule (1 mg) by mouth once daily at bedtime. Take 1 pill at bed time, Disp: 90 capsule, Rfl: 0    sertraline (Zoloft) 50 mg tablet, Take 1 tablet (50 mg) by mouth once daily. Take 1 pill in the morning, Disp: 90 tablet, Rfl: 0     Medical History:  Past Medical History:   Diagnosis Date    Addiction to drug (Multi)     ADHD (attention deficit hyperactivity disorder)     Alcohol abuse     Alcoholism (Multi)     Anxiety     Bipolar 1 disorder (Multi)     Depression     Hallucination     Memory loss     Panic attack     PTSD (post-traumatic stress disorder)     Schizoaffective disorder (Multi)     Sleep difficulties     Substance abuse (Multi)      Suicide attempt (Multi)      Surgical History:  Past Surgical History:   Procedure Laterality Date    APPENDECTOMY      HERNIA REPAIR      WISDOM TOOTH EXTRACTION       Family History:  Family History   Problem Relation Name Age of Onset    Hypertension Mother      Colon polyps Mother      Sleep apnea Father      Other (dm 2) Maternal Grandmother      Colon cancer Maternal Grandfather      Sleep apnea Paternal Grandmother       Social History:  Social History     Socioeconomic History    Marital status: Single     Spouse name: Not on file    Number of children: Not on file    Years of education: Not on file    Highest education level: Not on file   Occupational History    Not on file   Tobacco Use    Smoking status: Every Day     Current packs/day: 1.00     Average packs/day: 1 pack/day for 5.0 years (5.0 ttl pk-yrs)     Types: Cigarettes     Passive exposure: Current    Smokeless tobacco: Never    Tobacco comments:     Vapes daily   Vaping Use    Vaping status: Every Day    Substances: Nicotine   Substance and Sexual Activity    Alcohol use: Not Currently    Drug use: Not Currently     Types: Cocaine, MDMA (ecstacy)     Comment: history of coacaine, ecstacy and marijuana use    Sexual activity: Yes     Partners: Female, Male     Birth control/protection: Condom Male   Other Topics Concern    Not on file   Social History Narrative    Not on file     Social Drivers of Health     Financial Resource Strain: Not on file   Food Insecurity: Not on file   Transportation Needs: Not on file   Physical Activity: Not on file   Stress: Not on file   Social Connections: Not on file   Intimate Partner Violence: Not on file     Vitals:  There were no vitals filed for this visit.  Allergies:  No Known Allergies    -PCP: Romina Cloud MD  -Pt reports currently is not pregnant, and currently is sexually active, uses birth control.   -TBI/head trauma/LOC/seizure hx: Denies    Objective   Appearance: Well groomed    Attitude:  "Calm, cooperative, and engaged in conversation.    Behavior: Appropriate eye contact.     Motor Activity: No psychomotor agitation or retardation. No abnormal movements, tremors or tics. No evidence of extrapyramidal symptoms or tardive dyskinesia.    Speech: Regular rate, rhythm, volume. Spontaneous, no pressured speech.    Mood:  \" Slightly irritated \"    Affect: Full range, mood congruent.    Thought Process: Linear, logical, and goal-directed. No loose associations or gross thought disorganization.    Thought Content: Denied current suicidal ideation or thoughts of harm to self, denied homicidal ideation or thoughts of harm to others. No delusional thinking elicited. No perseverations or obsessions identified.     Perception: Did not endorse auditory or visual hallucinations, did not appear to be responding to hallucinatory stimuli.     Cognition: Alert, oriented x3. Preserved attention span and concentration, recent and remote memory. Adequate fund of knowledge. No deficits in language.     Insight: Fair, in regards to understanding mental health condition    Judgement: Fair        Physical Exam      -Nelson was seen today for anxiety, bipolar, depression, med management and follow-up.  Diagnoses and all orders for this visit:  DOMO (generalized anxiety disorder)  -     Follow Up In Psychiatry  -     busPIRone (Buspar) 7.5 mg tablet; Take 1 tablet (7.5 mg) by mouth 2 times a day.  -     melatonin 10 mg tablet; Take 1 tablet (10 mg) by mouth once daily. Take 1 pill before bed  -     sertraline (Zoloft) 50 mg tablet; Take 1 tablet (50 mg) by mouth once daily. Take 1 pill in the morning  Bipolar 1 disorder (Multi)  -     Follow Up In Psychiatry  -     ARIPiprazole (Abilify) 15 mg tablet; Take 1 tablet (15 mg) by mouth once daily. Take half a dose ( 7.5 mg) in the morning  -     sertraline (Zoloft) 50 mg tablet; Take 1 tablet (50 mg) by mouth once daily. Take 1 pill in the morning  PTSD (post-traumatic stress " disorder)  -     Follow Up In Psychiatry  -     prazosin (Minipress) 1 mg capsule; Take 1 capsule (1 mg) by mouth once daily at bedtime. Take 1 pill at bed time         MEDICAL-DECISION MAKING    -Patient educated and verbalized understanding on benefits, risks, and side effects of current psychiatric medications.     Psych med regimen as follows:  -RE START taking the medications you stopped taking AMA 2 weeks ago  -RE START Zoloft but 25 mg daily for 7 days, then go back to your regular 50 mg daily on day 8  -RE START Abilify 15 mg daily ( 7.5 mg in the morning and 7.5 mg at night)  -RE  START Minipress 1 mg nightly  -START Psychotherapy  -CONTINUE exercising and eating heathy diet   -CALL OFFICE IN CASE OF SIDE EFFECT TO SCHEDULE A VISIT FOR ASSESSMENT -[phone]    SI/HI ASSESSMENT  -Patient denied current suicidal ideation or thoughts of harm to self, denied homicidal ideation or thoughts of harm to others. No delusional thinking elicited. No perseverations or obsessions identified.     PLAN  -Continue Medications as directed.  -Follow-up with this provider in 8 weeks.  -Risks/benefits/assessment of medication interventions discussed with pt; pt agreeable to plan. Will continue to monitor for symptoms mgmt and SEs and adjust plan as needed.  -MI to increase coping skills/behavior regulation.  -Safety plan reviewed.  -Call  Psychiatry at [phone] with issues.  -For [facility] residents, [employer] is a 24/7 hotline you can call for assistance at [phone] [phone]. Please call 911 or go to your closest Emergency Room if you feel worse. This includes thoughts of hurting yourself or anyone else, or having other troubles such as hearing voices, seeing visions, or having new and scary thoughts about the people around you.    OARRS:  Fabby Nayak, APRN-CNP on 11/13/2024  2:07 PM  I have personally reviewed the OARRS report for Nelson Osborn. I have considered the risks of abuse, dependence,  addiction and diversion  Medication is felt to be clinically appropriate based on documented diagnosis.      Fabby Nayak, APRN-CNP  Record Review: extensive  CALL OFFICE IN CASE OF SIDE EFFECT TO SCHEDULE A VISIT FOR ASSESSMENT -[phone]  Follow up:     January 21st at 0300 pm virtually  Time Spent:  Prep:   Direct time: 25 minutes  Documentation:  5 minutes  Total: 30 minutes

## 2024-11-14 ENCOUNTER — APPOINTMENT (OUTPATIENT)
Dept: BEHAVIORAL HEALTH | Facility: CLINIC | Age: 24
End: 2024-11-14
Payer: OTHER GOVERNMENT

## 2025-01-03 ENCOUNTER — APPOINTMENT (OUTPATIENT)
Dept: OBSTETRICS AND GYNECOLOGY | Facility: CLINIC | Age: 25
End: 2025-01-03
Payer: OTHER GOVERNMENT

## 2025-01-03 VITALS
BODY MASS INDEX: 24.66 KG/M2 | WEIGHT: 148 LBS | SYSTOLIC BLOOD PRESSURE: 113 MMHG | HEIGHT: 65 IN | DIASTOLIC BLOOD PRESSURE: 65 MMHG

## 2025-01-03 DIAGNOSIS — Z30.42 ENCOUNTER FOR MANAGEMENT AND INJECTION OF DEPO-PROVERA: Primary | ICD-10-CM

## 2025-01-03 RX ORDER — MEDROXYPROGESTERONE ACETATE 150 MG/ML
150 INJECTION, SUSPENSION INTRAMUSCULAR ONCE
Status: COMPLETED | OUTPATIENT
Start: 2025-01-03 | End: 2025-01-03

## 2025-01-03 RX ADMIN — MEDROXYPROGESTERONE ACETATE 150 MG: 150 INJECTION, SUSPENSION INTRAMUSCULAR at 10:31

## 2025-01-03 NOTE — PROGRESS NOTES
Patient here today for Depo injection    Last Depo: 10/10/2024  Next Depo due: 3/21-4/4/2025  Last MARIVEL: 6/18/2024  UPT result (if done): deferred  NDC# 9777-0843-75  Lot# [id number]  Exp: 3/31/2028  Left deltoid  LMP: unknown  Complaints with Depo: none  Patient or office supplied: office  Patient given Depo with no difficulties  Patient tolerated injection well  Education offered

## 2025-01-21 ENCOUNTER — APPOINTMENT (OUTPATIENT)
Dept: BEHAVIORAL HEALTH | Facility: CLINIC | Age: 25
End: 2025-01-21
Payer: OTHER GOVERNMENT

## 2025-03-26 ENCOUNTER — APPOINTMENT (OUTPATIENT)
Dept: OBSTETRICS AND GYNECOLOGY | Facility: CLINIC | Age: 25
End: 2025-03-26
Payer: OTHER GOVERNMENT

## 2025-05-05 ENCOUNTER — APPOINTMENT (OUTPATIENT)
Dept: OBSTETRICS AND GYNECOLOGY | Facility: CLINIC | Age: 25
End: 2025-05-05
Payer: OTHER GOVERNMENT

## 2025-05-05 VITALS
SYSTOLIC BLOOD PRESSURE: 117 MMHG | HEIGHT: 65 IN | OXYGEN SATURATION: 99 % | DIASTOLIC BLOOD PRESSURE: 78 MMHG | BODY MASS INDEX: 22.49 KG/M2 | WEIGHT: 135 LBS | HEART RATE: 73 BPM

## 2025-05-05 DIAGNOSIS — Z30.09 BIRTH CONTROL COUNSELING: Primary | ICD-10-CM

## 2025-05-05 PROCEDURE — 3008F BODY MASS INDEX DOCD: CPT | Performed by: OBSTETRICS & GYNECOLOGY

## 2025-05-05 PROCEDURE — 99213 OFFICE O/P EST LOW 20 MIN: CPT | Performed by: OBSTETRICS & GYNECOLOGY

## 2025-05-05 RX ORDER — LEVONORGESTREL/ETHIN.ESTRADIOL 0.1-0.02MG
1 TABLET ORAL DAILY
Qty: 90 TABLET | Refills: 3 | Status: SHIPPED | OUTPATIENT
Start: 2025-05-05 | End: 2026-05-05

## 2025-05-05 ASSESSMENT — ENCOUNTER SYMPTOMS
CONSTITUTIONAL NEGATIVE: 1
RESPIRATORY NEGATIVE: 1
EYES NEGATIVE: 1
CARDIOVASCULAR NEGATIVE: 1
ENDOCRINE NEGATIVE: 1
GASTROINTESTINAL NEGATIVE: 1

## 2025-05-05 NOTE — PROGRESS NOTES
Subjective   Patient ID: Nelson Osborn is a 24 y.o. female who presents for Contraception.    HPI  25 y/o  presenting for birth control counseling.  Was on depo but did not like side effects.  Wants to discuss other options.   Patient without h/o HTN, DvT/PE, migraines with aura.  Is a smoker.    Review of Systems   Constitutional: Negative.    HENT: Negative.     Eyes: Negative.    Respiratory: Negative.     Cardiovascular: Negative.    Gastrointestinal: Negative.    Endocrine: Negative.    Genitourinary: Negative.      Objective   Physical Exam  Gen in NAD    Assessment/Plan   25 y/o  presenting for birth control counseling.  Discussed all options in detail, patient has no contraindications to estrogen (discussed smoker over 35 is a contraindication however she is 24).  After discussion, desires COCs.  Sent to pharmacy.  Will call back if desires another option.  RTC for annual exam or earlier with any concerns.    Cristy Lares MD 25 1:38 PM

## 2025-07-02 ENCOUNTER — TELEPHONE (OUTPATIENT)
Dept: PRIMARY CARE | Facility: CLINIC | Age: 25
End: 2025-07-02
Payer: OTHER GOVERNMENT

## 2025-07-02 NOTE — TELEPHONE ENCOUNTER
Reviewed patient chart with Of[facility].   Patient has never been seen by our office.   VENESSA is listed as PCP, could have been done by insurance??   VM left for patient to call back to get more information.  If has not been seen by office, inform Dr. Cloud is not accepting new patients.

## 2025-07-02 NOTE — TELEPHONE ENCOUNTER
Appointment Request From: Nelson Osborn     With Provider: Romina Cloud [[facility]]     Preferred Date Range: 7/1/2025 - 7/20/2025     Preferred Times: Any     Reason for visit: Annual Physical     Health Maintenance Topic: Yearly Adult Physical     Comments:  Annual physical

## 2025-07-04 ENCOUNTER — APPOINTMENT (OUTPATIENT)
Dept: CARDIOLOGY | Facility: HOSPITAL | Age: 25
End: 2025-07-04
Payer: OTHER GOVERNMENT

## 2025-07-04 ENCOUNTER — APPOINTMENT (OUTPATIENT)
Dept: RADIOLOGY | Facility: HOSPITAL | Age: 25
End: 2025-07-04
Payer: OTHER GOVERNMENT

## 2025-07-04 ENCOUNTER — HOSPITAL ENCOUNTER (EMERGENCY)
Facility: HOSPITAL | Age: 25
Discharge: HOME | End: 2025-07-04
Attending: EMERGENCY MEDICINE
Payer: OTHER GOVERNMENT

## 2025-07-04 VITALS
OXYGEN SATURATION: 97 % | BODY MASS INDEX: 22.49 KG/M2 | HEIGHT: 65 IN | HEART RATE: 76 BPM | SYSTOLIC BLOOD PRESSURE: 137 MMHG | RESPIRATION RATE: 16 BRPM | DIASTOLIC BLOOD PRESSURE: 96 MMHG | TEMPERATURE: 98.2 F | WEIGHT: 135 LBS

## 2025-07-04 DIAGNOSIS — R07.9 CHEST PAIN, UNSPECIFIED TYPE: Primary | ICD-10-CM

## 2025-07-04 LAB
ALBUMIN SERPL BCP-MCNC: 4.8 G/DL (ref 3.4–5)
ALP SERPL-CCNC: 40 U/L (ref 33–120)
ALT SERPL W P-5'-P-CCNC: 25 U/L (ref 7–52)
ANION GAP SERPL CALC-SCNC: 11 MMOL/L (ref 10–20)
AST SERPL W P-5'-P-CCNC: 23 U/L (ref 9–39)
ATRIAL RATE: 76 BPM
B-HCG SERPL-ACNC: <2 MIU/ML
BASOPHILS # BLD AUTO: 0.05 X10*3/UL (ref 0–0.1)
BASOPHILS NFR BLD AUTO: 0.7 %
BILIRUB SERPL-MCNC: 0.5 MG/DL (ref 0–1.2)
BUN SERPL-MCNC: 9 MG/DL (ref 6–23)
CALCIUM SERPL-MCNC: 9.1 MG/DL (ref 8.6–10.3)
CARDIAC TROPONIN I PNL SERPL HS: <3 NG/L (ref 0–20)
CARDIAC TROPONIN I PNL SERPL HS: <3 NG/L (ref 0–20)
CHLORIDE SERPL-SCNC: 106 MMOL/L (ref 98–107)
CO2 SERPL-SCNC: 23 MMOL/L (ref 21–32)
CREAT SERPL-MCNC: 0.67 MG/DL (ref 0.5–1.3)
D DIMER PPP FEU-MCNC: <215 NG/ML FEU
EGFRCR SERPLBLD CKD-EPI 2021: >90 ML/MIN/1.73M*2
EOSINOPHIL # BLD AUTO: 0.04 X10*3/UL (ref 0–0.7)
EOSINOPHIL NFR BLD AUTO: 0.5 %
ERYTHROCYTE [DISTWIDTH] IN BLOOD BY AUTOMATED COUNT: 12.3 % (ref 11.5–14.5)
GLUCOSE SERPL-MCNC: 81 MG/DL (ref 74–99)
HCT VFR BLD AUTO: 39.4 % (ref 36–46)
HGB BLD-MCNC: 13.6 G/DL (ref 12–16)
IMM GRANULOCYTES # BLD AUTO: 0.01 X10*3/UL (ref 0–0.7)
IMM GRANULOCYTES NFR BLD AUTO: 0.1 % (ref 0–0.9)
LYMPHOCYTES # BLD AUTO: 2.36 X10*3/UL (ref 1.2–4.8)
LYMPHOCYTES NFR BLD AUTO: 32.1 %
MCH RBC QN AUTO: 30.6 PG (ref 26–34)
MCHC RBC AUTO-ENTMCNC: 34.5 G/DL (ref 32–36)
MCV RBC AUTO: 89 FL (ref 80–100)
MONOCYTES # BLD AUTO: 0.36 X10*3/UL (ref 0.1–1)
MONOCYTES NFR BLD AUTO: 4.9 %
NEUTROPHILS # BLD AUTO: 4.54 X10*3/UL (ref 1.2–7.7)
NEUTROPHILS NFR BLD AUTO: 61.7 %
NRBC BLD-RTO: 0 /100 WBCS (ref 0–0)
P AXIS: 49 DEGREES
P OFFSET: 191 MS
P ONSET: 145 MS
PLATELET # BLD AUTO: 282 X10*3/UL (ref 150–450)
POTASSIUM SERPL-SCNC: 3.5 MMOL/L (ref 3.5–5.3)
PR INTERVAL: 152 MS
PROT SERPL-MCNC: 7.3 G/DL (ref 6.4–8.2)
Q ONSET: 221 MS
QRS COUNT: 12 BEATS
QRS DURATION: 92 MS
QT INTERVAL: 374 MS
QTC CALCULATION(BAZETT): 420 MS
QTC FREDERICIA: 404 MS
R AXIS: 87 DEGREES
RBC # BLD AUTO: 4.44 X10*6/UL (ref 4–5.2)
SODIUM SERPL-SCNC: 136 MMOL/L (ref 136–145)
T AXIS: 59 DEGREES
T OFFSET: 408 MS
TSH SERPL-ACNC: 1.04 MIU/L (ref 0.44–3.98)
VENTRICULAR RATE: 76 BPM
WBC # BLD AUTO: 7.4 X10*3/UL (ref 4.4–11.3)

## 2025-07-04 PROCEDURE — 93005 ELECTROCARDIOGRAM TRACING: CPT

## 2025-07-04 PROCEDURE — 99285 EMERGENCY DEPT VISIT HI MDM: CPT | Performed by: EMERGENCY MEDICINE

## 2025-07-04 PROCEDURE — 36415 COLL VENOUS BLD VENIPUNCTURE: CPT | Performed by: EMERGENCY MEDICINE

## 2025-07-04 PROCEDURE — 71045 X-RAY EXAM CHEST 1 VIEW: CPT

## 2025-07-04 PROCEDURE — 85379 FIBRIN DEGRADATION QUANT: CPT | Performed by: EMERGENCY MEDICINE

## 2025-07-04 PROCEDURE — 85025 COMPLETE CBC W/AUTO DIFF WBC: CPT | Performed by: EMERGENCY MEDICINE

## 2025-07-04 PROCEDURE — 84484 ASSAY OF TROPONIN QUANT: CPT | Performed by: EMERGENCY MEDICINE

## 2025-07-04 PROCEDURE — 84702 CHORIONIC GONADOTROPIN TEST: CPT | Performed by: EMERGENCY MEDICINE

## 2025-07-04 PROCEDURE — 80053 COMPREHEN METABOLIC PANEL: CPT | Performed by: EMERGENCY MEDICINE

## 2025-07-04 PROCEDURE — 71045 X-RAY EXAM CHEST 1 VIEW: CPT | Performed by: RADIOLOGY

## 2025-07-04 PROCEDURE — 84443 ASSAY THYROID STIM HORMONE: CPT | Performed by: EMERGENCY MEDICINE

## 2025-07-04 ASSESSMENT — PAIN SCALES - GENERAL
PAINLEVEL_OUTOF10: 4
PAINLEVEL_OUTOF10: 4

## 2025-07-04 ASSESSMENT — PAIN DESCRIPTION - DESCRIPTORS: DESCRIPTORS: ACHING

## 2025-07-04 ASSESSMENT — PAIN - FUNCTIONAL ASSESSMENT: PAIN_FUNCTIONAL_ASSESSMENT: 0-10

## 2025-07-04 ASSESSMENT — PAIN DESCRIPTION - LOCATION: LOCATION: CHEST

## 2025-07-04 NOTE — Clinical Note
Nelson Osborn was seen and treated in our emergency department on 7/4/2025.  He may return to work on 07/07/2025.       If you have any questions or concerns, please don't hesitate to call.      Nohemy Lyons MD

## 2025-07-04 NOTE — Clinical Note
Nelson Osborn was seen and treated in our emergency department on 7/4/2025.  He may return to work on 07/14/2025.       If you have any questions or concerns, please don't hesitate to call.      Nohemy Lyons MD

## 2025-07-04 NOTE — DISCHARGE INSTRUCTIONS
Please follow-up with the cardiologist provided, Dr. Tony for further evaluation.  Given the history of palpitations and fluttering in her chest, cardiology referral and possibly heart monitor/Holter monitor may be recommended.  Return to the emergency room for any worsening concerning symptoms otherwise.

## 2025-07-06 LAB
ATRIAL RATE: 65 BPM
P AXIS: 50 DEGREES
P OFFSET: 192 MS
P ONSET: 137 MS
PR INTERVAL: 164 MS
Q ONSET: 219 MS
QRS COUNT: 11 BEATS
QRS DURATION: 94 MS
QT INTERVAL: 392 MS
QTC CALCULATION(BAZETT): 407 MS
QTC FREDERICIA: 402 MS
R AXIS: 83 DEGREES
T AXIS: 53 DEGREES
T OFFSET: 415 MS
VENTRICULAR RATE: 65 BPM

## 2025-07-11 ENCOUNTER — APPOINTMENT (OUTPATIENT)
Dept: CARDIOLOGY | Facility: CLINIC | Age: 25
End: 2025-07-11
Payer: OTHER GOVERNMENT

## 2025-07-11 VITALS
HEART RATE: 79 BPM | DIASTOLIC BLOOD PRESSURE: 64 MMHG | WEIGHT: 126 LBS | BODY MASS INDEX: 20.97 KG/M2 | SYSTOLIC BLOOD PRESSURE: 102 MMHG

## 2025-07-11 DIAGNOSIS — R07.9 CHEST PAIN, UNSPECIFIED TYPE: ICD-10-CM

## 2025-07-11 DIAGNOSIS — R00.2 PALPITATIONS: Primary | ICD-10-CM

## 2025-07-11 PROCEDURE — 99205 OFFICE O/P NEW HI 60 MIN: CPT | Performed by: NURSE PRACTITIONER

## 2025-07-11 NOTE — PROGRESS NOTES
Chief Complaint:  Chief Complaint   Patient presents with    [facility]     Intermittent chest pain x 5 months increasing in frequency        Nelson Osborn is a 25 y.o. adult that presents to the office today for new patient cardiac evaluation.  She has no documented history for CAD or valvular abnormalities.  Denies previous cardiac evaluation.  She has a past medical history of anxiety, bipolar, PTSD, recreational drug abuse.   She denies tobacco use.  Admits to daily vaping.  Reports alcohol use approximately every other day.  Denies any recent drug use.  Reports occasional energy drinks.  She reports eating disorder she will fast for an entire day and then binge eat the next day.  She reports she walks frequently for exercise.  She is employed as a .  Family history positive for mother with hypertension.    7/4/2025 [facility] ER with chest pain.  Reported chest pain that occurred while at work that was described as sharp midsternal, sharp she reports being nauseated along with the chest pain but denies vomiting.  She did admit to chronic nausea which she associates with her anxiety.  EKG showed NSR with sinus arrhythmia HR 65 bpm.  Chest x-ray no acute changes.  Labs unremarkable.    She reports she has been nauseated constantly for approximately 1 month.  Denies any vomiting.  Reports she has been having chest pain on and off for a while which she describes as a midsternal sharp pain that lasts for approximately 1 to 2 seconds sometimes the pain is so sharp it takes her breath away.  She does report occasional palpitations and lightheadedness.  She questions whether some of her symptoms are related to anxiety.  She reports she is currently not taking any medications for anxiety or bipolar she has stopped all medications.    Testing Reviewed  CBC:   Lab Results   Component Value Date    WBC 7.4 07/04/2025    RBC 4.44 07/04/2025    HGB 13.6 07/04/2025    HCT 39.4 07/04/2025     07/04/2025         CMP:    Lab Results   Component Value Date     07/04/2025    K 3.5 07/04/2025     07/04/2025    CO2 23 07/04/2025    BUN 9 07/04/2025    CREATININE 0.67 07/04/2025    GLUCOSE 81 07/04/2025    CALCIUM 9.1 07/04/2025       BMP:  Lab Results   Component Value Date     07/04/2025     05/04/2024     10/09/2023    K 3.5 07/04/2025    K 3.8 05/04/2024    K 3.9 10/09/2023     07/04/2025     (H) 05/04/2024     10/09/2023    CO2 23 07/04/2025    CO2 22 05/04/2024    CO2 22 10/09/2023    BUN 9 07/04/2025    BUN 10 05/04/2024    BUN 10 10/09/2023    CREATININE 0.67 07/04/2025    CREATININE 0.62 05/04/2024    CREATININE 0.79 10/09/2023       CBC:  Lab Results   Component Value Date    WBC 7.4 07/04/2025    WBC 7.6 07/05/2024    WBC 13.6 (H) 05/04/2024    RBC 4.44 07/04/2025    RBC 4.17 07/05/2024    RBC 4.18 05/04/2024    HGB 13.6 07/04/2025    HGB 12.6 07/05/2024    HGB 12.4 05/04/2024    HCT 39.4 07/04/2025    HCT 38.2 07/05/2024    HCT 36.4 05/04/2024    MCV 89 07/04/2025    MCV 92 07/05/2024    MCV 87 05/04/2024    MCH 30.6 07/04/2025    MCH 30.2 07/05/2024    MCH 29.7 05/04/2024    MCHC 34.5 07/04/2025    MCHC 33.0 07/05/2024    MCHC 34.1 05/04/2024    RDW 12.3 07/04/2025    RDW 13.1 07/05/2024    RDW 13.5 05/04/2024     07/04/2025     07/05/2024     05/04/2024    MPV 9.0 10/09/2023       Hepatic Function Panel:    Lab Results   Component Value Date    ALKPHOS 40 07/04/2025    ALT 25 07/04/2025    AST 23 07/04/2025    PROT 7.3 07/04/2025    BILITOT 0.5 07/04/2025       TSH:    Lab Results   Component Value Date    TSH 1.04 07/04/2025       Cardiac Enzymes:    Lab Results   Component Value Date    TROPHS <3 07/04/2025    TROPHS <3 07/04/2025    TROPHS <3 05/04/2024         Problem List[1]    Social History[2]    Medical History[3]    Current Medications[4]    Patient has no known allergies.    Family History[5]    Surgical History[6]       Review of  systems  Constitutional: No weight loss, fever, chills, weakness or fatigue  HEENT: No visual loss, blurred vision, double vision or yellow sclerae  Skin: No rash or itching  Cardiovascular: Positive for chest discomfort, with palpitations.  Respiratory: No shortness of breath, cough or sputum  Gastrointestinal: No nausea, vomiting or diarrhea. No bloody or dark tarry stools.  Neurological: Positive for lightheadedness.  No headache,  syncope.   Musculoskeletal: No muscle, back pain, joint pain or stiffness.  Hematologic: No anemia, bleeding or bruising.    /64 (BP Location: Left arm, Patient Position: Sitting, BP Cuff Size: Adult)   Pulse 79   Wt 57.2 kg (126 lb)   LMP 06/27/2025   BMI 20.97 kg/m²     Physical Exam  Constitutional: Well developed, awake/alert x 3, no distress.  Head/Neck: No JVD, No bruits  Respiratory/Thorax: patent airways, CTAB, normal breath sounds with good expansion.  Cardiovascular: Regular rate and rhythm, no murmurs, normal S1 and S2,   Gastrointestinal: Non distended, soft, non-tender, no rebound tenderness or guarding.  Extremities: No cyanosis, edema.   Neurological: Alert and oriented x 3. Moves extremities spontaneous with purpose.  Psychological: Appropriate mood and behavior  Skin: Warm and Dry. No lesions or rashes.     Assessment/Plan  Chest pain;  reports episodes of sharp midsternal chest pain that he associates with palpitations.    Obtain GXT treadmill stress test  Palpitations; reports frequent palpitations associated with chest discomfort.  Obtain 24-hour monitor to assess for atrial/ventricular arrhythmias.  She has been advised to stop vaping.  Follow-up in the office after testing for results and further recommendations.      Please excuse any errors in grammar or translation related to dictation, voice recognition software was used to prepare this document.         [1]   Patient Active Problem List  Diagnosis    Bipolar 1 disorder (Multi)    DOMO (generalized  anxiety disorder)    PTSD (post-traumatic stress disorder)    Restlessness and agitation    Missed  ([facility])    Chest pain   [2]   Social History  Tobacco Use    Smoking status: Every Day     Current packs/day: 1.00     Average packs/day: 1 pack/day for 5.0 years (5.0 ttl pk-yrs)     Types: Cigarettes     Passive exposure: Current    Smokeless tobacco: Never    Tobacco comments:     Vapes daily   Vaping Use    Vaping status: Every Day    Substances: Nicotine   Substance Use Topics    Alcohol use: Yes    Drug use: Not Currently     Types: Cocaine, MDMA (ecstacy)     Comment: history of coacaine, ecstacy and marijuana use   [3]   Past Medical History:  Diagnosis Date    Addiction to drug (Multi)     ADHD (attention deficit hyperactivity disorder)     Alcohol abuse     Alcoholism (Multi)     Anxiety     Bipolar 1 disorder (Multi)     Depression     Hallucination     Memory loss     Panic attack     PTSD (post-traumatic stress disorder)     Schizoaffective disorder (Multi)     Sleep difficulties     Substance abuse     Suicide attempt (Multi)    [4]   Current Outpatient Medications:     levonorgestreL-ethinyl estrad (Aviane) 0.1-20 mg-mcg tablet, Take 1 tablet by mouth once daily., Disp: 90 tablet, Rfl: 3  [5]   Family History  Problem Relation Name Age of Onset    Hypertension Mother Humera el     Colon polyps Mother Humera el     Sleep apnea Father      Other (dm 2) Maternal Grandmother      Colon cancer Maternal Grandfather Hayden     Sleep apnea Paternal Grandmother     [6]   Past Surgical History:  Procedure Laterality Date    APPENDECTOMY      HERNIA REPAIR      WISDOM TOOTH EXTRACTION

## 2025-07-23 ENCOUNTER — ANCILLARY PROCEDURE (OUTPATIENT)
Dept: CARDIOLOGY | Facility: HOSPITAL | Age: 25
End: 2025-07-23
Payer: OTHER GOVERNMENT

## 2025-07-23 DIAGNOSIS — R07.9 CHEST PAIN, UNSPECIFIED TYPE: ICD-10-CM

## 2025-07-23 DIAGNOSIS — R00.2 PALPITATIONS: ICD-10-CM

## 2025-07-23 PROCEDURE — 93016 CV STRESS TEST SUPVJ ONLY: CPT | Performed by: INTERNAL MEDICINE

## 2025-07-23 PROCEDURE — 93018 CV STRESS TEST I&R ONLY: CPT | Performed by: INTERNAL MEDICINE

## 2025-07-23 PROCEDURE — 93017 CV STRESS TEST TRACING ONLY: CPT

## 2025-07-29 LAB
ATRIAL RATE: 76 BPM
P AXIS: 49 DEGREES
P OFFSET: 191 MS
P ONSET: 145 MS
PR INTERVAL: 152 MS
Q ONSET: 221 MS
QRS COUNT: 12 BEATS
QRS DURATION: 92 MS
QT INTERVAL: 374 MS
QTC CALCULATION(BAZETT): 420 MS
QTC FREDERICIA: 404 MS
R AXIS: 87 DEGREES
T AXIS: 59 DEGREES
T OFFSET: 408 MS
VENTRICULAR RATE: 76 BPM

## 2025-07-30 ENCOUNTER — APPOINTMENT (OUTPATIENT)
Dept: CARDIOLOGY | Facility: CLINIC | Age: 25
End: 2025-07-30
Payer: OTHER GOVERNMENT

## 2025-07-30 VITALS
WEIGHT: 121.2 LBS | SYSTOLIC BLOOD PRESSURE: 106 MMHG | BODY MASS INDEX: 20.19 KG/M2 | HEART RATE: 84 BPM | HEIGHT: 65 IN | DIASTOLIC BLOOD PRESSURE: 80 MMHG

## 2025-07-30 DIAGNOSIS — R07.9 CHEST PAIN, UNSPECIFIED TYPE: ICD-10-CM

## 2025-07-30 DIAGNOSIS — Z79.899 MEDICATION COURSE CHANGED: ICD-10-CM

## 2025-07-30 DIAGNOSIS — R00.2 PALPITATIONS: ICD-10-CM

## 2025-07-30 PROCEDURE — 99214 OFFICE O/P EST MOD 30 MIN: CPT | Performed by: INTERNAL MEDICINE

## 2025-07-30 PROCEDURE — 3008F BODY MASS INDEX DOCD: CPT | Performed by: INTERNAL MEDICINE

## 2025-07-30 RX ORDER — PROPRANOLOL HYDROCHLORIDE 10 MG/1
10 TABLET ORAL 2 TIMES DAILY
Qty: 180 TABLET | Refills: 3 | Status: SHIPPED | OUTPATIENT
Start: 2025-07-30 | End: 2026-07-30

## 2025-07-30 RX ORDER — LANOLIN ALCOHOL/MO/W.PET/CERES
400 CREAM (GRAM) TOPICAL DAILY
Qty: 90 TABLET | Refills: 3 | Status: SHIPPED | OUTPATIENT
Start: 2025-07-30 | End: 2026-07-30

## 2025-07-30 NOTE — PROGRESS NOTES
Chief Complaint: Patient is new to this provider    Chief Complaint   Patient presents with    Results     Follow up to review recent stress testing results   Primary MD Dr. Romina Cloud   I am incorporating some of the details from a recent office note by Mary Bowels NP to the current chart.  Patient assessed independently today.  She has a past medical history of anxiety, bipolar, PTSD, recreational drug abuse.   She denies tobacco use.  Admits to daily vaping.  Reports alcohol use approximately every other day.  Denies any recent drug use.  Reports occasional energy drinks.  She reports eating disorder she will fast for an entire day and then binge eat the next day.  She reports she walks frequently for exercise.  She is employed as a .  Family history positive for mother with hypertension.  She saw Mary Bowles NP initially, who ordered a treadmill stress test and a 24-hour Holter monitor, recommended lifestyle modification, and this is a follow-up after testing  Subjective :     Review of Systems she does not complain of any lightheadedness presyncope or syncope.  Continues to have palpitations which she describes as a flip-flopping sensation that comes and goes.  She is trying to stay well-hydrated.  She went off the birth control pills, thinking that some of her symptoms were initiated by the birth control pill.  She continues to have twinges of sharp momentary left-sided chest pain severe in intensity lasting a few seconds, not associated with radiation diaphoresis lightheadedness, does not occur with activity or meals, but sometimes it does start in the pit of her stomach and then move upward.  They do not wake her up from sleep she says she is not pregnant.    History so Far :    1.  Chest pain best described as atypical, nonanginal  2.  Palpitations  3.  Treadmill stress test July 20 25-14.3 minutes 89% age-predicted maximum heart rate no clinical or EKG evidence of  "ischemia      Objective   Wt Readings from Last 3 Encounters:   07/30/25 55 kg (121 lb 3.2 oz)   07/11/25 57.2 kg (126 lb)   07/04/25 61.2 kg (135 lb)        Vitals:    07/30/25 1101   BP: 106/80   BP Location: Left arm   Patient Position: Sitting   Pulse: 84   Weight: 55 kg (121 lb 3.2 oz)   Height: 1.651 m (5' 5\")           Physical Exam:    GENERAL APPEARANCE: in no acute distress.  CHEST: Symmetric and non-tender.  INTEGUMENT: Skin warm and dry  HEENT: No gross abnormalities identified.No pallor or scleral icterus.  NECK: Supple, no JVD, no bruit.   NEURO/PSHCY: Alert and oriented x3; appropriate behavior and responses and responses  LUNGS: Clear to auscultation bilaterally; normal respiratory effort.  HEART: Rate and rhythm regular with no evident murmur; no gallop appreciated.   ABDOMEN: Soft, non tender.  MUSCULOSKELETAL: No gross deformities.  EXTREMITIES: Warm  There is no edema noted.    Meds:  Current Outpatient Medications   Medication Instructions    levonorgestreL-ethinyl estrad (Aviane) 0.1-20 mg-mcg tablet 1 tablet, oral, Daily    magnesium oxide (Mag-Ox) 400 mg (241.3 mg elemental) tablet 1 tablet, oral, Daily    propranolol (INDERAL) 10 mg, oral, 2 times daily        Allergies:  Patient has no known allergies.      LABS:      Testing Reviewed  Labs    CBC:   Lab Results   Component Value Date    WBC 7.4 07/04/2025    RBC 4.44 07/04/2025    HGB 13.6 07/04/2025    HCT 39.4 07/04/2025     07/04/2025        CMP:    Lab Results   Component Value Date     07/04/2025    K 3.5 07/04/2025     07/04/2025    CO2 23 07/04/2025    BUN 9 07/04/2025    CREATININE 0.67 07/04/2025    GLUCOSE 81 07/04/2025    CALCIUM 9.1 07/04/2025       Lipid Profile:    No results found for: \"CHLPL\", \"CHOL\", \"TRIG\", \"HDL\", \"LDLDIRECT\", \"LDLCALC\"    HgBA1c:    No results found for: \"HGBA1C\"    Magnesium:    No results found for: \"MG\"    FREE T4:  No results found for: \"FREET4\"    TSH:    Lab Results   Component " Value Date    TSH 1.04 07/04/2025         Reviewed all available pertinent laboratory data and diagnostic testing results that occurred after the last office visit with me                Assessment:    1. Chest pain, unspecified type  Follow Up In Cardiology    propranolol (Inderal) 10 mg tablet    magnesium oxide (Mag-Ox) 400 mg (241.3 mg elemental) tablet    Follow Up In Cardiology      2. Palpitations  Follow Up In Cardiology    propranolol (Inderal) 10 mg tablet    magnesium oxide (Mag-Ox) 400 mg (241.3 mg elemental) tablet    Follow Up In Cardiology      3. Medication course changed  Follow Up In Cardiology           Clinical Decision Making:    Patient will return after her monitor is completed.  In the interim I have encouraged her to stay well-hydrated, start magnesium oxide 400 mg p.o. twice daily and propranolol 10 mg p.o. twice daily.    Follow up : 3 months                IEvaristo LPN am scribing for, and in the presence of Dr. Hyacinth Lindquist MD, FACC.       I, Dr. Hyacinth Lindquist MD, FACC, personally performed the services described in the documentation as scribed by Evaristo ROCHA in my presence, and confirm it is both accurate and complete.

## 2025-07-30 NOTE — PATIENT INSTRUCTIONS
Start Propranolol 10 mg take one tab twice daily  MagOx 400 mg take one tab twice daily    Please bring any lab results from other providers / physicians to your next appointment.     Please bring all medicines, vitamins, and herbal supplements with you when you come to the office.     Prescriptions will not be filled unless you are compliant with your follow up appointments or have a follow up appointment scheduled as per instruction of your physician. Refills should be requested at the time of your visit.      DID YOU KNOW:  We have a pharmacy here in the [facility].  They can fill all prescriptions, not just cardiac medications.  Prescriptions from other pharmacies can easily be transferred to the  pharmacy by the  pharmacist on site.   pharmacies offer FREE HOME DELIVERY on medications to anywhere in [place]. They can sync your medications. Typically prescriptions can be ready in 10 - 15 minutes. If pharmacy is unable to fill your  prescription or if cost is more than your paying now the Pharmacist can easily transfer back to your Pharmacy of choice. Pharmacy phone # [phone].

## 2025-08-04 ENCOUNTER — APPOINTMENT (OUTPATIENT)
Facility: CLINIC | Age: 25
End: 2025-08-04
Payer: OTHER GOVERNMENT

## 2025-08-07 ENCOUNTER — APPOINTMENT (OUTPATIENT)
Dept: RADIOLOGY | Facility: HOSPITAL | Age: 25
End: 2025-08-07
Payer: OTHER GOVERNMENT

## 2025-08-07 ENCOUNTER — HOSPITAL ENCOUNTER (EMERGENCY)
Facility: HOSPITAL | Age: 25
Discharge: HOME | End: 2025-08-07
Payer: OTHER GOVERNMENT

## 2025-08-07 ENCOUNTER — APPOINTMENT (OUTPATIENT)
Dept: CARDIOLOGY | Facility: HOSPITAL | Age: 25
End: 2025-08-07
Payer: OTHER GOVERNMENT

## 2025-08-07 VITALS
RESPIRATION RATE: 17 BRPM | BODY MASS INDEX: 19.99 KG/M2 | HEIGHT: 65 IN | TEMPERATURE: 98.1 F | DIASTOLIC BLOOD PRESSURE: 67 MMHG | SYSTOLIC BLOOD PRESSURE: 112 MMHG | OXYGEN SATURATION: 98 % | HEART RATE: 78 BPM | WEIGHT: 120 LBS

## 2025-08-07 DIAGNOSIS — R07.89 CHEST TIGHTNESS: ICD-10-CM

## 2025-08-07 DIAGNOSIS — F14.90 COCAINE USE: Primary | ICD-10-CM

## 2025-08-07 LAB
ALBUMIN SERPL BCP-MCNC: 4.6 G/DL (ref 3.4–5)
ALP SERPL-CCNC: 48 U/L (ref 33–110)
ALT SERPL W P-5'-P-CCNC: 19 U/L (ref 7–45)
AMPHETAMINES UR QL SCN: ABNORMAL
ANION GAP SERPL CALC-SCNC: 13 MMOL/L (ref 10–20)
AST SERPL W P-5'-P-CCNC: 18 U/L (ref 9–39)
B-HCG SERPL-ACNC: <2 MIU/ML
BARBITURATES UR QL SCN: ABNORMAL
BASOPHILS # BLD AUTO: 0.07 X10*3/UL (ref 0–0.1)
BASOPHILS NFR BLD AUTO: 0.5 %
BENZODIAZ UR QL SCN: ABNORMAL
BILIRUB SERPL-MCNC: 0.4 MG/DL (ref 0–1.2)
BNP SERPL-MCNC: 31 PG/ML (ref 0–99)
BUN SERPL-MCNC: 5 MG/DL (ref 6–23)
BZE UR QL SCN: ABNORMAL
CALCIUM SERPL-MCNC: 9 MG/DL (ref 8.6–10.3)
CANNABINOIDS UR QL SCN: ABNORMAL
CARDIAC TROPONIN I PNL SERPL HS: 7 NG/L (ref 0–13)
CARDIAC TROPONIN I PNL SERPL HS: 8 NG/L (ref 0–13)
CHLORIDE SERPL-SCNC: 107 MMOL/L (ref 98–107)
CK SERPL-CCNC: 99 U/L (ref 0–215)
CO2 SERPL-SCNC: 22 MMOL/L (ref 21–32)
CREAT SERPL-MCNC: 0.6 MG/DL (ref 0.5–1.05)
D DIMER PPP FEU-MCNC: <215 NG/ML FEU
EGFRCR SERPLBLD CKD-EPI 2021: >90 ML/MIN/1.73M*2
EOSINOPHIL # BLD AUTO: 0.04 X10*3/UL (ref 0–0.7)
EOSINOPHIL NFR BLD AUTO: 0.3 %
ERYTHROCYTE [DISTWIDTH] IN BLOOD BY AUTOMATED COUNT: 12.1 % (ref 11.5–14.5)
FENTANYL+NORFENTANYL UR QL SCN: ABNORMAL
GLUCOSE SERPL-MCNC: 84 MG/DL (ref 74–99)
HCT VFR BLD AUTO: 39.4 % (ref 36–46)
HGB BLD-MCNC: 13.6 G/DL (ref 12–16)
IMM GRANULOCYTES # BLD AUTO: 0.03 X10*3/UL (ref 0–0.7)
IMM GRANULOCYTES NFR BLD AUTO: 0.2 % (ref 0–0.9)
LYMPHOCYTES # BLD AUTO: 2.68 X10*3/UL (ref 1.2–4.8)
LYMPHOCYTES NFR BLD AUTO: 17.8 %
MAGNESIUM SERPL-MCNC: 2.09 MG/DL (ref 1.6–2.4)
MCH RBC QN AUTO: 30.5 PG (ref 26–34)
MCHC RBC AUTO-ENTMCNC: 34.5 G/DL (ref 32–36)
MCV RBC AUTO: 88 FL (ref 80–100)
METHADONE UR QL SCN: ABNORMAL
MONOCYTES # BLD AUTO: 0.88 X10*3/UL (ref 0.1–1)
MONOCYTES NFR BLD AUTO: 5.9 %
NEUTROPHILS # BLD AUTO: 11.34 X10*3/UL (ref 1.2–7.7)
NEUTROPHILS NFR BLD AUTO: 75.3 %
NRBC BLD-RTO: 0 /100 WBCS (ref 0–0)
OPIATES UR QL SCN: ABNORMAL
OXYCODONE+OXYMORPHONE UR QL SCN: ABNORMAL
PCP UR QL SCN: ABNORMAL
PLATELET # BLD AUTO: 282 X10*3/UL (ref 150–450)
POTASSIUM SERPL-SCNC: 3.7 MMOL/L (ref 3.5–5.3)
PROT SERPL-MCNC: 7 G/DL (ref 6.4–8.2)
RBC # BLD AUTO: 4.46 X10*6/UL (ref 4–5.2)
SODIUM SERPL-SCNC: 138 MMOL/L (ref 136–145)
WBC # BLD AUTO: 15 X10*3/UL (ref 4.4–11.3)

## 2025-08-07 PROCEDURE — 84484 ASSAY OF TROPONIN QUANT: CPT | Performed by: PHYSICIAN ASSISTANT

## 2025-08-07 PROCEDURE — 99285 EMERGENCY DEPT VISIT HI MDM: CPT | Performed by: PHYSICIAN ASSISTANT

## 2025-08-07 PROCEDURE — 85025 COMPLETE CBC W/AUTO DIFF WBC: CPT | Performed by: PHYSICIAN ASSISTANT

## 2025-08-07 PROCEDURE — 82550 ASSAY OF CK (CPK): CPT | Performed by: PHYSICIAN ASSISTANT

## 2025-08-07 PROCEDURE — 2500000001 HC RX 250 WO HCPCS SELF ADMINISTERED DRUGS (ALT 637 FOR MEDICARE OP): Performed by: PHYSICIAN ASSISTANT

## 2025-08-07 PROCEDURE — 96374 THER/PROPH/DIAG INJ IV PUSH: CPT

## 2025-08-07 PROCEDURE — 84702 CHORIONIC GONADOTROPIN TEST: CPT | Performed by: PHYSICIAN ASSISTANT

## 2025-08-07 PROCEDURE — 85379 FIBRIN DEGRADATION QUANT: CPT | Performed by: PHYSICIAN ASSISTANT

## 2025-08-07 PROCEDURE — 80053 COMPREHEN METABOLIC PANEL: CPT | Performed by: PHYSICIAN ASSISTANT

## 2025-08-07 PROCEDURE — 2500000004 HC RX 250 GENERAL PHARMACY W/ HCPCS (ALT 636 FOR OP/ED): Performed by: PHYSICIAN ASSISTANT

## 2025-08-07 PROCEDURE — 83880 ASSAY OF NATRIURETIC PEPTIDE: CPT | Performed by: PHYSICIAN ASSISTANT

## 2025-08-07 PROCEDURE — 99285 EMERGENCY DEPT VISIT HI MDM: CPT | Mod: 25

## 2025-08-07 PROCEDURE — 71045 X-RAY EXAM CHEST 1 VIEW: CPT

## 2025-08-07 PROCEDURE — 96361 HYDRATE IV INFUSION ADD-ON: CPT

## 2025-08-07 PROCEDURE — 93005 ELECTROCARDIOGRAM TRACING: CPT

## 2025-08-07 PROCEDURE — 80307 DRUG TEST PRSMV CHEM ANLYZR: CPT | Performed by: PHYSICIAN ASSISTANT

## 2025-08-07 PROCEDURE — 71045 X-RAY EXAM CHEST 1 VIEW: CPT | Performed by: RADIOLOGY

## 2025-08-07 PROCEDURE — 36415 COLL VENOUS BLD VENIPUNCTURE: CPT | Performed by: PHYSICIAN ASSISTANT

## 2025-08-07 PROCEDURE — 83735 ASSAY OF MAGNESIUM: CPT | Performed by: PHYSICIAN ASSISTANT

## 2025-08-07 RX ORDER — NALOXONE HYDROCHLORIDE 4 MG/.1ML
1 SPRAY NASAL AS NEEDED
Qty: 2 EACH | Refills: 0 | Status: SHIPPED | OUTPATIENT
Start: 2025-08-07

## 2025-08-07 RX ORDER — ACETAMINOPHEN 325 MG/1
975 TABLET ORAL ONCE
Status: COMPLETED | OUTPATIENT
Start: 2025-08-07 | End: 2025-08-07

## 2025-08-07 RX ORDER — ONDANSETRON HYDROCHLORIDE 2 MG/ML
4 INJECTION, SOLUTION INTRAVENOUS ONCE
Status: COMPLETED | OUTPATIENT
Start: 2025-08-07 | End: 2025-08-07

## 2025-08-07 RX ADMIN — ONDANSETRON 4 MG: 2 INJECTION, SOLUTION INTRAMUSCULAR; INTRAVENOUS at 11:37

## 2025-08-07 RX ADMIN — SODIUM CHLORIDE, SODIUM LACTATE, POTASSIUM CHLORIDE, AND CALCIUM CHLORIDE 1000 ML: .6; .31; .03; .02 INJECTION, SOLUTION INTRAVENOUS at 11:36

## 2025-08-07 RX ADMIN — ACETAMINOPHEN 975 MG: 325 TABLET ORAL at 11:36

## 2025-08-07 ASSESSMENT — PAIN DESCRIPTION - LOCATION: LOCATION: THROAT

## 2025-08-07 ASSESSMENT — PAIN DESCRIPTION - PAIN TYPE: TYPE: ACUTE PAIN

## 2025-08-07 ASSESSMENT — PAIN SCALES - GENERAL
PAINLEVEL_OUTOF10: 3
PAINLEVEL_OUTOF10: 0 - NO PAIN
PAINLEVEL_OUTOF10: 3
PAINLEVEL_OUTOF10: 5 - MODERATE PAIN

## 2025-08-07 ASSESSMENT — HEART SCORE
RISK FACTORS: 1-2 RISK FACTORS
TROPONIN: LESS THAN OR EQUAL TO NORMAL LIMIT
HEART SCORE: 1
AGE: <45
ECG: NORMAL
HISTORY: SLIGHTLY SUSPICIOUS

## 2025-08-07 ASSESSMENT — LIFESTYLE VARIABLES
HAVE YOU EVER FELT YOU SHOULD CUT DOWN ON YOUR DRINKING: NO
EVER FELT BAD OR GUILTY ABOUT YOUR DRINKING: NO
EVER HAD A DRINK FIRST THING IN THE MORNING TO STEADY YOUR NERVES TO GET RID OF A HANGOVER: NO
HAVE PEOPLE ANNOYED YOU BY CRITICIZING YOUR DRINKING: NO
TOTAL SCORE: 0

## 2025-08-07 ASSESSMENT — PAIN - FUNCTIONAL ASSESSMENT
PAIN_FUNCTIONAL_ASSESSMENT: 0-10
PAIN_FUNCTIONAL_ASSESSMENT: 0-10

## 2025-08-07 NOTE — ED PROVIDER NOTES
"Emergency Department Encounter  [facility] EMERGENCY MEDICINE    Patient: Nelson Osborn  MRN: [id number]  : 2000  Date of Evaluation: 2025  ED Provider: Cassidy Michael PA-C        History of Present Illness     This is a 24 y/o F presenting to the ED c/o generalized weakness and chest tightness. Pt states she 'did too much cocaine last night' but cannot recall how much. She endorses that she uses cocaine a 'few times a week' and snorts it. She also endorses alcohol use, most recently last night. She states she uses e-cigarettes such as vapes. She endorses chest tightness and feels that her heart is beating slow. She also endorses some shortness of breath and sore throat but is tolerating liquids, protecting her airway and clearing secretions. She endorses a headache without any visual changes, numbness, or tingling. She endorses some vague epigastric abdominal pain. Denies any dysuria, hematuria, hematochezia, melena, diarrhea, vomiting, fever, or chills. She denies any intention of self harm. She is open to a conversation of rehab.      History provided by:  Patient   used: No             Visit Vitals  /72   Pulse 88   Temp 36.4 °C (97.5 °F) (Temporal)   Resp 16   Ht 1.651 m (5' 5\")   Wt 54.4 kg (120 lb)   SpO2 97%   BMI 19.97 kg/m²   OB Status Having periods   Smoking Status Every Day   BSA 1.58 m²          Physical Exam       Triage vitals:  T 36.4 °C (97.5 °F)  HR (!) 115  /83  RR 18  O2 98 % None (Room air)    Physical Exam     Physical exam:   General: Vitals noted, no distress. Afebrile.   EENT:  Hearing grossly intact. Normal phonation. MMM. Airway patient. PERRL. EOMI.   Neck: No midline tenderness or paraspinal tenderness. FROM.   Cardiac: Tachycardic, regular rhythm. Normal S1 and S2.  No murmurs, gallops, rubs.   Pulmonary: Good air exchange. Lungs clear bilaterally. No wheezes, rhonchi, rales. No accessory muscle use.   Abdomen: Soft, " nonsurgical. Nontender. No peritoneal signs.   Back: No CVA tenderness. No midline tenderness or paraspinal tenderness. No obvious deformity or step off.   Extremities: No peripheral edema.  Full range of motion. Moves all extremities freely. No tenderness throughout extremities.   Skin: No rash. Warm and Dry.   Neuro: No focal neurologic deficits. CN 2-12 grossly intact. Sensation equal bilaterally. No weakness.       Results       Labs Reviewed   CBC WITH AUTO DIFFERENTIAL - Abnormal       Result Value    WBC 15.0 (*)     nRBC 0.0      RBC 4.46      Hemoglobin 13.6      Hematocrit 39.4      MCV 88      MCH 30.5      MCHC 34.5      RDW 12.1      Platelets 282      Neutrophils % 75.3      Immature Granulocytes %, Automated 0.2      Lymphocytes % 17.8      Monocytes % 5.9      Eosinophils % 0.3      Basophils % 0.5      Neutrophils Absolute 11.34 (*)     Immature Granulocytes Absolute, Automated 0.03      Lymphocytes Absolute 2.68      Monocytes Absolute 0.88      Eosinophils Absolute 0.04      Basophils Absolute 0.07     COMPREHENSIVE METABOLIC PANEL - Abnormal    Glucose 84      Sodium 138      Potassium 3.7      Chloride 107      Bicarbonate 22      Anion Gap 13      Urea Nitrogen 5 (*)     Creatinine 0.60      eGFR >90      Calcium 9.0      Albumin 4.6      Alkaline Phosphatase 48      Total Protein 7.0      AST 18      Bilirubin, Total 0.4      ALT 19     MAGNESIUM - Normal    Magnesium 2.09     B-TYPE NATRIURETIC PEPTIDE - Normal    BNP 31      Narrative:        <100 pg/mL - Heart failure unlikely  100-299 pg/mL - Intermediate probability of acute heart                  failure exacerbation. Correlate with clinical                  context and patient history.    >=300 pg/mL - Heart Failure likely. Correlate with clinical                  context and patient history.    BNP testing is performed using different testing methodology at [facility] than at other [facility] hospitals. Direct result  comparisons should only be made within the same method.      D-DIMER, VTE EXCLUSION - Normal    D-Dimer, Quantitative VTE Exclusion <215      Narrative:     The VTE Exclusion D-Dimer assay is reported in ng/mL Fibrinogen Equivalent Units (FEU).    Per 's instructions for use, a value of less than 500 ng/mL (FEU) may help to exclude DVT or PE in outpatients when the assay is used with a clinical pretest probability assessment.(AEMR must utilize and document eCalc 'Wells Score Deep Vein Thrombosis Risk' for DVT exclusion only. Emergency Department should utilize  Guidelines for Emergency Department Use of the VTE Exclusion D-Dimer and Clinical Pretest probability assessment model for DVT or PE exclusion.)   HUMAN CHORIONIC GONADOTROPIN, SERUM QUANTITATIVE - Normal    HCG, Beta-Quantitative <2      Narrative:      Total HCG measurement is performed using the [employer] Access   Immunoassay which detects intact HCG and free beta HCG subunit.    This test is not indicated for use as a tumor marker.   HCG testing is performed using a different test methodology at [facility] than other [facility]. Direct result comparison   should only be made within the same method.       SERIAL TROPONIN-INITIAL - Normal    Troponin I, High Sensitivity 8      Narrative:     Less than 99th percentile of normal range cutoff-  Female and children under 18 years old <14 ng/L; Male <21 ng/L: Negative  Repeat testing should be performed if clinically indicated.     Female and children under 18 years old 14-50 ng/L; Male 21-50 ng/L:  Consistent with possible cardiac damage and possible increased clinical   risk. Serial measurements may help to assess extent of myocardial damage.     >50 ng/L: Consistent with cardiac damage, increased clinical risk and  myocardial infarction. Serial measurements may help assess extent of   myocardial damage.      NOTE: Children less than 1 year old may have higher baseline  troponin   levels and results should be interpreted in conjunction with the overall   clinical context.     NOTE: Troponin I testing is performed using a different   testing methodology at [facility] than at other   [facility]. Direct result comparisons should only   be made within the same method.   SERIAL TROPONIN, 1 HOUR - Normal    Troponin I, High Sensitivity 7      Narrative:     Less than 99th percentile of normal range cutoff-  Female and children under 18 years old <14 ng/L; Male <21 ng/L: Negative  Repeat testing should be performed if clinically indicated.     Female and children under 18 years old 14-50 ng/L; Male 21-50 ng/L:  Consistent with possible cardiac damage and possible increased clinical   risk. Serial measurements may help to assess extent of myocardial damage.     >50 ng/L: Consistent with cardiac damage, increased clinical risk and  myocardial infarction. Serial measurements may help assess extent of   myocardial damage.      NOTE: Children less than 1 year old may have higher baseline troponin   levels and results should be interpreted in conjunction with the overall   clinical context.     NOTE: Troponin I testing is performed using a different   testing methodology at [facility] than at other   [facility]. Direct result comparisons should only   be made within the same method.   CREATINE KINASE - Normal    Creatine Kinase 99     TROPONIN SERIES- (INITIAL, 1 HR)    Narrative:     The following orders were created for panel order Troponin I Series, High Sensitivity (0, 1 HR).  Procedure                               Abnormality         Status                     ---------                               -----------         ------                     Troponin I, High Sensiti...[[id number]]  Normal              Final result               Troponin, High Sensitivi...[[id number]]  Normal              Final result                 Please view results for these  tests on the individual orders.   DRUG SCREEN,URINE       XR chest 1 view   Final Result   No evidence of acute intrathoracic abnormality.        Signed by: Chico Quintanilla 8/7/2025 1:34 PM   Dictation workstation:   [id number]            Medical Decision Making & ED Course     Scoring Tools Utilized: HEART Score: 1       ED Course & MDM     Medical Decision Making  This is a 25-year-old female with past medical history of cocaine use who presents to the ED with chest tightness and shortness of breath as well as recent cocaine use.  Vitals that show she is tachycardic 115 bpm.  Vitals otherwise grossly unremarkable.  On examination patient is overall well-appearing.  She was tachycardic.  Lungs clear bilaterally.  Abdomen soft with no significant tenderness.  No lower extremity edema.  Patient ordered Zofran, Tylenol, and 1 L of IV fluids.  On reassessment she was feeling improved.  Laboratory studies obtained and showed normal CK, normal troponin, D-dimer less than 215.  BNP normal at 31.  CMP grossly unremarkable.  CBC showed elevated WBC at 15.0.  Patient denied any fevers or chills.  She denies any signs of infection.  EKG sinus tachycardia without acute ischemic changes.  Chest x-ray obtained and showed no acute cardiopulmonary process.  Very low suspicion for ACS based on patient's normal workup today and description of her symptoms.  Patient was discussed with [facility] who did provide the patient and her family members with resources.  Patient was instructed on how to get fentanyl test strips through her county where she lives.  She was also prescribed a Narcan kit as needed for any opiate overdose.  Patient is medically clear and stable at this time.  She is agreeable to discharge.  She was provided with outpatient resources by [facility].  Patient and her family had no further questions at this time         Diagnoses as of 08/07/25 1411   Cocaine use   Chest tightness          Social Determinants of Health which  Significantly Impact Care: Substance use disorder The following actions were taken to address these social determinants: [facility]    EKG Independent Interpretation: EKG sinus tachycardia without any acute ST elevation or depression.  No T wave inversions.  Normal axis.  QT/QTc 332/457.    Independent Result Review and Interpretation: Chest X-Ray as interpreted by me revealed no pneumonia or pneumothorax          Disposition   As a result of the work-up, the patient was discharged home.  she was informed of her diagnosis and instructed to come back with any concerns or worsening of condition.  she and was agreeable to the plan as discussed above.  she was given the opportunity to ask questions.  All of the patient's questions were answered.    Procedures     Patient was seen independently    Procedures    Cassidy Michael PA-C  Emergency Medicine      Cassidy Michael PA-C  08/07/25 2459

## 2025-08-07 NOTE — Clinical Note
Nelson Osborn was seen and treated in our emergency department on 8/7/2025.  She may return to work on 08/10/2025.       If you have any questions or concerns, please don't hesitate to call.      Cassidy Michael PA-C

## 2025-08-07 NOTE — DISCHARGE INSTRUCTIONS
You were prescribed a Narcan kit.  This can be used in the case of opiate overdose.  There are instructions on the box, I would recommend you show this see friends and family around you so they know how to adequately use this and where it is located.  It should be used in the case of severe altered mental status, difficulty breathing, or if someone is unconscious with concern for overdose on opiates.

## 2025-08-08 LAB
ATRIAL RATE: 114 BPM
P AXIS: 85 DEGREES
P OFFSET: 196 MS
P ONSET: 141 MS
PR INTERVAL: 158 MS
Q ONSET: 220 MS
QRS COUNT: 18 BEATS
QRS DURATION: 90 MS
QT INTERVAL: 332 MS
QTC CALCULATION(BAZETT): 457 MS
QTC FREDERICIA: 411 MS
R AXIS: 87 DEGREES
T AXIS: 67 DEGREES
T OFFSET: 386 MS
VENTRICULAR RATE: 114 BPM

## 2025-09-04 ENCOUNTER — APPOINTMENT (OUTPATIENT)
Dept: CARDIOLOGY | Facility: CLINIC | Age: 25
End: 2025-09-04
Payer: OTHER GOVERNMENT

## 2025-09-12 ENCOUNTER — APPOINTMENT (OUTPATIENT)
Dept: BEHAVIORAL HEALTH | Facility: CLINIC | Age: 25
End: 2025-09-12
Payer: OTHER GOVERNMENT

## 2025-10-29 ENCOUNTER — APPOINTMENT (OUTPATIENT)
Dept: CARDIOLOGY | Facility: CLINIC | Age: 25
End: 2025-10-29
Payer: OTHER GOVERNMENT

## 2026-01-26 ENCOUNTER — APPOINTMENT (OUTPATIENT)
Dept: DERMATOLOGY | Facility: CLINIC | Age: 26
End: 2026-01-26
Payer: OTHER GOVERNMENT

## (undated) DEVICE — SOLUTION, IRRIGATION, STERILE WATER, 1000 ML, POUR BOTTLE

## (undated) DEVICE — COLLECTION SET, VACURETTE, BERKLEY, 6 FT

## (undated) DEVICE — SOLUTION, IRRIGATION, SODIUM CHLORIDE 0.9%, 1000 ML, POUR BOTTLE

## (undated) DEVICE — Device